# Patient Record
Sex: MALE | Race: WHITE | NOT HISPANIC OR LATINO | Employment: FULL TIME | URBAN - METROPOLITAN AREA
[De-identification: names, ages, dates, MRNs, and addresses within clinical notes are randomized per-mention and may not be internally consistent; named-entity substitution may affect disease eponyms.]

---

## 2017-05-30 ENCOUNTER — HOSPITAL ENCOUNTER (EMERGENCY)
Facility: CLINIC | Age: 57
Discharge: HOME | End: 2017-05-30
Attending: FAMILY MEDICINE | Admitting: FAMILY MEDICINE
Payer: COMMERCIAL

## 2017-05-30 VITALS
SYSTOLIC BLOOD PRESSURE: 157 MMHG | WEIGHT: 170 LBS | OXYGEN SATURATION: 98 % | TEMPERATURE: 97.8 F | RESPIRATION RATE: 16 BRPM | DIASTOLIC BLOOD PRESSURE: 73 MMHG

## 2017-05-30 DIAGNOSIS — S61.215A LACERATION OF LEFT RING FINGER W/O FOREIGN BODY W/O DAMAGE TO NAIL, INITIAL ENCOUNTER: ICD-10-CM

## 2017-05-30 DIAGNOSIS — S61.217A LACERATION OF LEFT LITTLE FINGER W/O FOREIGN BODY W/O DAMAGE TO NAIL, INITIAL ENCOUNTER: ICD-10-CM

## 2017-05-30 PROCEDURE — 99283 EMERGENCY DEPT VISIT LOW MDM: CPT | Mod: 25

## 2017-05-30 PROCEDURE — 99283 EMERGENCY DEPT VISIT LOW MDM: CPT | Mod: 25 | Performed by: FAMILY MEDICINE

## 2017-05-30 PROCEDURE — 12002 RPR S/N/AX/GEN/TRNK2.6-7.5CM: CPT

## 2017-05-30 PROCEDURE — 12002 RPR S/N/AX/GEN/TRNK2.6-7.5CM: CPT | Performed by: FAMILY MEDICINE

## 2017-05-30 RX ORDER — ATENOLOL 25 MG/1
12.5 TABLET ORAL DAILY
COMMUNITY
Start: 2016-11-08

## 2017-05-30 RX ORDER — CEPHALEXIN 500 MG/1
500 CAPSULE ORAL 4 TIMES DAILY
Qty: 12 CAPSULE | Refills: 0 | Status: SHIPPED | OUTPATIENT
Start: 2017-05-30 | End: 2017-06-02

## 2017-05-30 RX ORDER — SIMVASTATIN 20 MG
20 TABLET ORAL EVERY EVENING
COMMUNITY
Start: 2016-11-08

## 2017-05-30 NOTE — ED AVS SNAPSHOT
[facility] Emergency Department    [address] [address]    Phone:  [phone]    Fax:  [phone]                                       Jarret Eddy   MRN: [id number]    Department:  [facility] Emergency Department   Date of Visit:  5/30/2017           Patient Information     Date Of Birth          1960        Your diagnoses for this visit were:     Laceration of left little finger w/o foreign body w/o damage to nail, initial encounter     Laceration of left ring finger w/o foreign body w/o damage to nail, initial encounter        You were seen by Ignacio Dean MD.        Discharge Instructions       Return to the Emergency Room if the following occurs:     Worsened pain, expanding redness and swelling, fever, or for any concern at anytime.    Or, follow-up with the following provider as we discussed:     Return to your primary doctor for suture removal in 10-14 days.    Medications discussed:    Antibiotic ointment twice daily until stitches are removed.  Keflex.    If you received pain-relieving or sedating medication during your time in the ER, avoid alcohol, driving automobiles, or working with machinery.  Also, a responsible adult must stay with you.      If you had X-rays or labs done we will attempt to contact you if there is a change needed in your care.      Call the Nurse Advice Line at [phone] or [phone] for any concern at anytime.      24 Hour Appointment Hotline       To make an appointment at any [facility], call [phone] ([phone]). If you don't have a family doctor or clinic, we will help you find one. [facility] are conveniently located to serve the needs of you and your family.             Review of your medicines      START taking        Dose / Directions Last dose taken    cephALEXin 500 MG capsule   Commonly known as:  KEFLEX   Dose:  500 mg   Quantity:  12 capsule        Take 1 capsule (500 mg) by mouth 4 times  daily for 3 days   Refills:  0          Our records show that you are taking the medicines listed below. If these are incorrect, please call your family doctor or clinic.        Dose / Directions Last dose taken    atenolol 25 MG tablet   Commonly known as:  TENORMIN   Dose:  12.5 mg        Take 12.5 mg by mouth daily   Refills:  0        simvastatin 20 MG tablet   Commonly known as:  ZOCOR   Dose:  20 mg        Take 20 mg by mouth every evening   Refills:  0                Prescriptions were sent or printed at these locations (1 Prescription)                   [place] [id number]  [place] [address] [address] [facility]   [address] [facility] [address]    Telephone:  [phone]   Fax:  [phone]   Hours:                  E-Prescribed (1 of 1)         cephALEXin (KEFLEX) 500 MG capsule                Orders Needing Specimen Collection     None      Pending Results     No orders found from 5/28/2017 to 5/31/2017.            Pending Culture Results     No orders found from 5/28/2017 to 5/31/2017.            Pending Results Instructions     If you had any lab results that were not finalized at the time of your Discharge, you can call the ED Lab Result RN at [phone]. You will be contacted by this team for any positive Lab results or changes in treatment. The nurses are available 7 days a week from 10A to 6:30P.  You can leave a message 24 hours per day and they will return your call.        Test Results From Your Hospital Stay               Thank you for choosing [facility]       Thank you for choosing [facility] for your care. Our goal is always to provide you with excellent care. Hearing back from our patients is one way we can continue to improve our services. Please take a few minutes to complete the written survey that you may receive in the mail after you visit with us. Thank you!        [employer] Information     [employer] lets you send messages to your doctor, view your test results, renew your  "prescriptions, schedule appointments and more. To sign up, go to [email or web address] . Click on \"Log in\" on the left side of the screen, which will take you to the Welcome page. Then click on \"Sign up Now\" on the right side of the page.     You will be asked to enter the access code listed below, as well as some personal information. Please follow the directions to create your username and password.     Your access code is: [email or web address]  Expires: 2017  6:06 PM     Your access code will  in 90 days. If you need help or a new code, please call your [facility] clinic or [phone].        Care EveryWhere ID     This is your Care EveryWhere ID. This could be used by other organizations to access your [facility] medical records  [id number]        After Visit Summary       This is your record. Keep this with you and show to your community pharmacist(s) and doctor(s) at your next visit.                  "

## 2017-05-30 NOTE — DISCHARGE INSTRUCTIONS
Return to the Emergency Room if the following occurs:     Worsened pain, expanding redness and swelling, fever, or for any concern at anytime.    Or, follow-up with the following provider as we discussed:     Return to your primary doctor for suture removal in 10-14 days.    Medications discussed:    Antibiotic ointment twice daily until stitches are removed.  Keflex.    If you received pain-relieving or sedating medication during your time in the ER, avoid alcohol, driving automobiles, or working with machinery.  Also, a responsible adult must stay with you.      If you had X-rays or labs done we will attempt to contact you if there is a change needed in your care.      Call the Nurse Advice Line at [phone] or [phone] for any concern at anytime.
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
## 2017-05-30 NOTE — ED AVS SNAPSHOT
[facility] Emergency Department    [address] [address]    Phone:  [phone]    Fax:  [phone]                                       Jarret Eddy   MRN: [id number]    Department:  [facility] Emergency Department   Date of Visit:  5/30/2017           After Visit Summary Signature Page     I have received my discharge instructions, and my questions have been answered. I have discussed any challenges I see with this plan with the nurse or doctor.    ..........................................................................................................................................  Patient/Patient Representative Signature      ..........................................................................................................................................  Patient Representative Print Name and Relationship to Patient    ..................................................               ................................................  Date                                            Time    ..........................................................................................................................................  Reviewed by Signature/Title    ...................................................              ..............................................  Date                                                            Time

## 2017-07-16 ENCOUNTER — HOSPITAL ENCOUNTER (EMERGENCY)
Facility: CLINIC | Age: 57
Discharge: HOME | End: 2017-07-16
Attending: FAMILY MEDICINE | Admitting: FAMILY MEDICINE
Payer: COMMERCIAL

## 2017-07-16 ENCOUNTER — APPOINTMENT (OUTPATIENT)
Dept: GENERAL RADIOLOGY | Facility: CLINIC | Age: 57
End: 2017-07-16
Attending: FAMILY MEDICINE
Payer: COMMERCIAL

## 2017-07-16 VITALS
RESPIRATION RATE: 16 BRPM | HEIGHT: 67 IN | DIASTOLIC BLOOD PRESSURE: 78 MMHG | HEART RATE: 86 BPM | BODY MASS INDEX: 26.68 KG/M2 | OXYGEN SATURATION: 100 % | SYSTOLIC BLOOD PRESSURE: 118 MMHG | TEMPERATURE: 98 F | WEIGHT: 170 LBS

## 2017-07-16 DIAGNOSIS — S42.102A CLOSED FRACTURE OF LEFT SCAPULA, UNSPECIFIED PART OF SCAPULA, INITIAL ENCOUNTER: ICD-10-CM

## 2017-07-16 PROCEDURE — 99283 EMERGENCY DEPT VISIT LOW MDM: CPT | Performed by: FAMILY MEDICINE

## 2017-07-16 PROCEDURE — 99283 EMERGENCY DEPT VISIT LOW MDM: CPT

## 2017-07-16 PROCEDURE — 73030 X-RAY EXAM OF SHOULDER: CPT | Mod: LT
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
## 2017-07-16 NOTE — ED AVS SNAPSHOT
[facility] Emergency Department    [address] [address]    Phone:  [phone]    Fax:  [phone]                                       Jarret Eddy   MRN: [id number]    Department:  [facility] Emergency Department   Date of Visit:  7/16/2017           After Visit Summary Signature Page     I have received my discharge instructions, and my questions have been answered. I have discussed any challenges I see with this plan with the nurse or doctor.    ..........................................................................................................................................  Patient/Patient Representative Signature      ..........................................................................................................................................  Patient Representative Print Name and Relationship to Patient    ..................................................               ................................................  Date                                            Time    ..........................................................................................................................................  Reviewed by Signature/Title    ...................................................              ..............................................  Date                                                            Time
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
## 2017-07-16 NOTE — ED AVS SNAPSHOT
[facility] Emergency Department    [address] [address]    Phone:  [phone]    Fax:  [phone]                                       Jarret Eddy   MRN: [id number]    Department:  [facility] Emergency Department   Date of Visit:  7/16/2017           Patient Information     Date Of Birth          1960        Your diagnoses for this visit were:     Closed fracture of left scapula, unspecified part of scapula, initial encounter        You were seen by Fabrice Bonilla MD.      Follow-up Information     Follow up with Aries Castillo DO. Schedule an appointment as soon as possible for a visit in 3 days.    Specialty:  Family Medicine - Sports Medicine    Contact information:    [facility]  [address] [address]  [phone]          Discharge Instructions       Shoulder immobilizer, rest, ice.  Tylenol/ibuprofen as needed for pain.  MRI of the left shoulder at your convenience and follow up in clinic with sports medicine/nonoperative orthopedics    Discharge References/Attachments     FRACTURE, SHOULDER BLADE OR COLLARBONE (ENGLISH)      24 Hour Appointment Hotline       To make an appointment at any [facility], call [phone] ([phone]). If you don't have a family doctor or clinic, we will help you find one. [facility] are conveniently located to serve the needs of you and your family.          ED Discharge Orders     MRI Upper extremity joint with & w/o contrast lt       Administration of IV contrast (contrast agent, dose, and amount) will be tailored to this examination per the appropriate written protocol listed in the Protocol E-Book, or by the supervising imaging provider.                     Review of your medicines      Our records show that you are taking the medicines listed below. If these are incorrect, please call your family doctor or clinic.        Dose / Directions Last dose taken    atenolol  25 MG tablet   Commonly known as:  TENORMIN   Dose:  12.5 mg        Take 12.5 mg by mouth daily   Refills:  0        simvastatin 20 MG tablet   Commonly known as:  ZOCOR   Dose:  20 mg        Take 20 mg by mouth every evening   Refills:  0                Procedures and tests performed during your visit     Shoulder XR, 2 view left      Orders Needing Specimen Collection     None      Pending Results     Date and Time Order Name Status Description    7/16/2017 1743 Shoulder XR, 2 view left Preliminary             Pending Culture Results     No orders found from 7/14/2017 to 7/17/2017.            Pending Results Instructions     If you had any lab results that were not finalized at the time of your Discharge, you can call the ED Lab Result RN at [phone]. You will be contacted by this team for any positive Lab results or changes in treatment. The nurses are available 7 days a week from 10A to 6:30P.  You can leave a message 24 hours per day and they will return your call.        Test Results From Your Hospital Stay        7/16/2017  6:18 PM      Narrative     SHOULDER TWO VIEW LEFT   7/16/2017 6:00 PM     HISTORY: Trauma, pain.    COMPARISON: None.     FINDINGS:  Severe degenerative changes of the left glenohumeral joint  are noted with large inferior osteophytes at the humeral head.  Moderate degenerative changes are seen at the left AC joint. The  humeral head is well located within the glenoid fossa.  Coracoclavicular space is maintained. There is irregularity of the  superior scapula on the AP view concerning for fracture. Irregular  superior glenoid is also noted and could represent fracture.        Impression     IMPRESSION:  1. Mildly displaced fracture of the superior scapular wing could  extend into the upper glenoid fossa.  2. Severe degenerative changes of the glenohumeral joint and moderate  degenerative changes of the AC joint.                Thank you for choosing [facility]       Thank you for  "choosing [facility] for your care. Our goal is always to provide you with excellent care. Hearing back from our patients is one way we can continue to improve our services. Please take a few minutes to complete the written survey that you may receive in the mail after you visit with us. Thank you!        [email or web address] Information     [employer] lets you send messages to your doctor, view your test results, renew your prescriptions, schedule appointments and more. To sign up, go to [email or web address] . Click on \"Log in\" on the left side of the screen, which will take you to the Welcome page. Then click on \"Sign up Now\" on the right side of the page.     You will be asked to enter the access code listed below, as well as some personal information. Please follow the directions to create your username and password.     Your access code is: [email or web address]  Expires: 2017  6:06 PM     Your access code will  in 90 days. If you need help or a new code, please call your [facility] clinic or [phone].        Care EveryWhere ID     This is your Care EveryWhere ID. This could be used by other organizations to access your [facility] medical records  [id number]        Equal Access to Services     TAM IBARRA : Charity Del Cid, carmita salazar, gómez chandler, jerry davidson. So [facility] [phone].    ATENCIÓN: Si habla español, tiene a zepeda disposición servicios gratuitos de asistencia lingüística. Llame al [phone].    We comply with applicable federal civil rights laws and [place] laws. We do not discriminate on the basis of race, color, national origin, age, disability sex, sexual orientation or gender identity.            After Visit Summary       This is your record. Keep this with you and show to your community pharmacist(s) and doctor(s) at your next visit.                  "

## 2017-07-16 NOTE — DISCHARGE INSTRUCTIONS
Shoulder immobilizer, rest, ice.  Tylenol/ibuprofen as needed for pain.  MRI of the left shoulder at your convenience and follow up in clinic with sports medicine/nonoperative orthopedics
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
## 2017-07-17 ENCOUNTER — OFFICE VISIT (OUTPATIENT)
Dept: ORTHOPEDICS | Facility: CLINIC | Age: 57
End: 2017-07-17
Payer: COMMERCIAL

## 2017-07-17 ENCOUNTER — HOSPITAL ENCOUNTER (OUTPATIENT)
Dept: CT IMAGING | Facility: CLINIC | Age: 57
Discharge: HOME | End: 2017-07-17
Attending: FAMILY MEDICINE | Admitting: FAMILY MEDICINE
Payer: COMMERCIAL

## 2017-07-17 VITALS
SYSTOLIC BLOOD PRESSURE: 170 MMHG | HEIGHT: 67 IN | HEART RATE: 60 BPM | BODY MASS INDEX: 26.68 KG/M2 | DIASTOLIC BLOOD PRESSURE: 79 MMHG | WEIGHT: 170 LBS

## 2017-07-17 DIAGNOSIS — M19.012 OSTEOARTHRITIS OF GLENOHUMERAL JOINT, LEFT: ICD-10-CM

## 2017-07-17 DIAGNOSIS — S42.102A CLOSED FRACTURE OF LEFT SCAPULA, UNSPECIFIED PART OF SCAPULA, INITIAL ENCOUNTER: ICD-10-CM

## 2017-07-17 DIAGNOSIS — S49.92XD SHOULDER INJURY, LEFT, SUBSEQUENT ENCOUNTER: ICD-10-CM

## 2017-07-17 DIAGNOSIS — S42.112D CLOSED DISPLACED FRACTURE OF BODY OF LEFT SCAPULA WITH ROUTINE HEALING, SUBSEQUENT ENCOUNTER: Primary | ICD-10-CM

## 2017-07-17 PROCEDURE — 73200 CT UPPER EXTREMITY W/O DYE: CPT | Mod: LT

## 2017-07-17 PROCEDURE — 99204 OFFICE O/P NEW MOD 45 MIN: CPT | Performed by: FAMILY MEDICINE
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
## 2017-07-17 NOTE — ED NOTES
Pt fit with immobilizer, CMS intact after placement. DC instructions reviewed with pt states understanding.

## 2017-08-02 ENCOUNTER — OFFICE VISIT (OUTPATIENT)
Dept: ORTHOPEDICS | Facility: CLINIC | Age: 57
End: 2017-08-02
Payer: COMMERCIAL

## 2017-08-02 VITALS
DIASTOLIC BLOOD PRESSURE: 85 MMHG | WEIGHT: 170 LBS | SYSTOLIC BLOOD PRESSURE: 130 MMHG | BODY MASS INDEX: 26.68 KG/M2 | HEIGHT: 67 IN

## 2017-08-02 DIAGNOSIS — S49.92XD SHOULDER INJURY, LEFT, SUBSEQUENT ENCOUNTER: Primary | ICD-10-CM

## 2017-08-02 DIAGNOSIS — M19.012 OSTEOARTHRITIS OF GLENOHUMERAL JOINT, LEFT: ICD-10-CM

## 2017-08-02 PROCEDURE — 99213 OFFICE O/P EST LOW 20 MIN: CPT | Performed by: FAMILY MEDICINE
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
## 2017-11-13 ENCOUNTER — OFFICE VISIT (OUTPATIENT)
Dept: ORTHOPEDICS | Facility: CLINIC | Age: 57
End: 2017-11-13
Payer: COMMERCIAL

## 2017-11-13 ENCOUNTER — RADIANT APPOINTMENT (OUTPATIENT)
Dept: GENERAL RADIOLOGY | Facility: CLINIC | Age: 57
End: 2017-11-13
Attending: PEDIATRICS
Payer: COMMERCIAL

## 2017-11-13 VITALS
SYSTOLIC BLOOD PRESSURE: 128 MMHG | WEIGHT: 177 LBS | HEIGHT: 67 IN | DIASTOLIC BLOOD PRESSURE: 76 MMHG | BODY MASS INDEX: 27.78 KG/M2

## 2017-11-13 DIAGNOSIS — M25.562 PAIN IN BOTH KNEES, UNSPECIFIED CHRONICITY: ICD-10-CM

## 2017-11-13 DIAGNOSIS — M25.561 PAIN IN BOTH KNEES, UNSPECIFIED CHRONICITY: Primary | ICD-10-CM

## 2017-11-13 DIAGNOSIS — S89.91XA INJURY OF RIGHT KNEE, INITIAL ENCOUNTER: ICD-10-CM

## 2017-11-13 DIAGNOSIS — M25.562 PAIN IN BOTH KNEES, UNSPECIFIED CHRONICITY: Primary | ICD-10-CM

## 2017-11-13 DIAGNOSIS — M25.561 PAIN IN BOTH KNEES, UNSPECIFIED CHRONICITY: ICD-10-CM

## 2017-11-13 PROCEDURE — 99214 OFFICE O/P EST MOD 30 MIN: CPT | Performed by: PEDIATRICS

## 2017-11-13 PROCEDURE — 73562 X-RAY EXAM OF KNEE 3: CPT | Mod: LT | Performed by: PEDIATRICS

## 2017-11-13 NOTE — MR AVS SNAPSHOT
After Visit Summary   11/13/2017    Jarret Eddy    MRN: [id number]           Patient Information     Date Of Birth          1960        Visit Information        Provider Department      11/13/2017 2:20 PM Aries Barone,  [facility]        Today's Diagnoses     Pain in both knees, unspecified chronicity    -  1    Injury of right knee, initial encounter          Care Instructions     Advanced imaging is done by appointment. Some insurance require a prior authorization to be completed which may delay the time until you are able to schedule your appointment.    Please call [facility]: [phone] to schedule your MRI.  Depending on your availability you can usually schedule within the next 1-2 days.    The clinic will call you with results, if you have not heard from the clinic within 3-4 days following your MRI please contact us at the number listed below.     If you have any further questions for your physician or physician s care team you can call [phone] and use option 3 to leave a voice message. Calls received during business hours will be returned same day.                  Follow-ups after your visit        Future tests that were ordered for you today     Open Future Orders        Priority Expected Expires Ordered    MR Knee Right w/o Contrast Routine  11/13/2018 11/13/2017            Who to contact     If you have questions or need follow up information about today's clinic visit or your schedule please contact [facility] directly at [phone].  Normal or non-critical lab and imaging results will be communicated to you by MyChart, letter or phone within 4 business days after the clinic has received the results. If you do not hear from us within 7 days, please contact the clinic through MyChart or phone. If you have a critical or abnormal lab result, we will notify you by phone as  "soon as possible.  Submit refill requests through [facility] or call your pharmacy and they will forward the refill request to us. Please allow 3 business days for your refill to be completed.          Additional Information About Your Visit        [employer] Information     [facility] lets you send messages to your doctor, view your test results, renew your prescriptions, schedule appointments and more. To sign up, go to [email or web address] . Click on \"Log in\" on the left side of the screen, which will take you to the Welcome page. Then click on \"Sign up Now\" on the right side of the page.     You will be asked to enter the access code listed below, as well as some personal information. Please follow the directions to create your username and password.     Your access code is: [email or web address]  Expires: 2018  3:20 PM     Your access code will  in 90 days. If you need help or a new code, please call your [facility] clinic or [phone].        Care EveryWhere ID     This is your Care EveryWhere ID. This could be used by other organizations to access your [facility] medical records  [id number]        Your Vitals Were     Height BMI (Body Mass Index)                5' 7\" (1.702 m) 27.72 kg/m2           Blood Pressure from Last 3 Encounters:   17 128/76   17 130/85   17 170/79    Weight from Last 3 Encounters:   17 177 lb (80.3 kg)   17 170 lb (77.1 kg)   17 170 lb (77.1 kg)               Primary Care Provider Office Phone # Fax #    [place] [facility] [phone] [address] [address]        Equal Access to Services     TAM IBARRA : Charity Del Cid, carmita salazar, jerry reynoso. So [facility] [phone].    ATENCIÓN: Si habla español, tiene a zepeda disposición servicios gratuitos de asistencia lingüística. Yunior al [phone].    We comply with applicable " federal civil rights laws and [place] laws. We do not discriminate on the basis of race, color, national origin, age, disability, sex, sexual orientation, or gender identity.            Thank you!     Thank you for choosing [facility]  for your care. Our goal is always to provide you with excellent care. Hearing back from our patients is one way we can continue to improve our services. Please take a few minutes to complete the written survey that you may receive in the mail after your visit with us. Thank you!             Your Updated Medication List - Protect others around you: Learn how to safely use, store and throw away your medicines at www.disposemymeds.org.          This list is accurate as of: 11/13/17  3:20 PM.  Always use your most recent med list.                   Brand Name Dispense Instructions for use Diagnosis    atenolol 25 MG tablet    TENORMIN     Take 12.5 mg by mouth daily        simvastatin 20 MG tablet    ZOCOR     Take 20 mg by mouth every evening
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
## 2017-11-13 NOTE — PATIENT INSTRUCTIONS
Advanced imaging is done by appointment. Some insurance require a prior authorization to be completed which may delay the time until you are able to schedule your appointment.    Please call [facility]: [phone] to schedule your MRI.  Depending on your availability you can usually schedule within the next 1-2 days.    The clinic will call you with results, if you have not heard from the clinic within 3-4 days following your MRI please contact us at the number listed below.     If you have any further questions for your physician or physician s care team you can call [phone] and use option 3 to leave a voice message. Calls received during business hours will be returned same day.
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
## 2017-11-15 ENCOUNTER — RADIANT APPOINTMENT (OUTPATIENT)
Dept: MRI IMAGING | Facility: CLINIC | Age: 57
End: 2017-11-15
Attending: PEDIATRICS
Payer: COMMERCIAL

## 2017-11-15 DIAGNOSIS — S89.91XA INJURY OF RIGHT KNEE, INITIAL ENCOUNTER: ICD-10-CM

## 2017-11-15 PROCEDURE — 73721 MRI JNT OF LWR EXTRE W/O DYE: CPT | Mod: TC

## 2017-11-17 ENCOUNTER — TELEPHONE (OUTPATIENT)
Dept: ORTHOPEDICS | Facility: CLINIC | Age: 57
End: 2017-11-17

## 2017-11-17 DIAGNOSIS — S83.241D OTHER TEAR OF MEDIAL MENISCUS OF RIGHT KNEE AS CURRENT INJURY, SUBSEQUENT ENCOUNTER: Primary | ICD-10-CM

## 2017-11-17 DIAGNOSIS — S83.511D RUPTURE OF ANTERIOR CRUCIATE LIGAMENT OF RIGHT KNEE, SUBSEQUENT ENCOUNTER: ICD-10-CM

## 2017-11-17 NOTE — TELEPHONE ENCOUNTER
Results for orders placed or performed in visit on 11/15/17   MR Knee Right w/o Contrast    Narrative    MR KNEE RIGHT WITHOUT CONTRAST   11/15/2017 3:51 PM    HISTORY:  Right knee pain and swelling since 8/30/2017 following an  injury.    COMPARISON: Radiographs on 11/13/2017.    TECHNIQUE:  Transverse and coronal T2 with fat suppression. Coronal  T1. Sagittal proton density and T2.    FINDINGS:     Medial Meniscus: There is a moderate-sized tear of the posterior horn  involving the superior and inferior articular surfaces. No displaced  meniscal fragment is seen.     Lateral Meniscus: There is prominent increased signal seen throughout  the majority of the meniscus, including horizontal linear component  involving the free edge articular surface. No displaced meniscal  fragment is seen.     Anterior Cruciate Ligament: The proximal fibers are not clearly seen  and the mid to distal fibers are oriented more horizontally than  typically seen. This is suspicious for a high-grade likely complete  tear.     Posterior Cruciate Ligament: Unremarkable.     Medial Collateral Ligament: Unremarkable.    Lateral Collateral Ligament Complex, Popliteus Tendon: The iliotibial  band, fibular collateral ligament, biceps femoris tendon, and  popliteus tendon are unremarkable.    Osseous and Cartilaginous Structures: No fracture or osseous lesion is  demonstrated. There is moderate marrow edema within the posterior  aspect of the lateral tibial plateau. No other abnormal marrow signal  intensity is seen. There is diffuse grade 2 chondromalacia throughout  the entire lateral compartment and to a lesser degree, the medial  femoral condyle and medial patellar facet.     Extensor Mechanism: The quadriceps and patellar tendons are  unremarkable. The medial and lateral patellar retinacula appear  unremarkable.    Joint Space: Large joint effusion. No definite loose bodies  appreciated.    Additional Findings: No Baker's cyst. No  semimembranosus-tibial  collateral ligament or pes anserine bursitis. No adjacent soft tissue  pathology is seen.      Impression    IMPRESSION:   1. Prominent tear of the lateral meniscus with a moderate-sized tear  of the medial meniscus.  2. High-grade probable complete tear of the proximal anterior cruciate  ligament.  3. Diffuse degenerative changes throughout the knee. Marrow edema in  the posterior aspect of the lateral tibial plateau may be degenerative  in nature or could represent a bone contusion.    LALI DE LA GARZA MD

## 2017-11-22 NOTE — TELEPHONE ENCOUNTER
LVM  For patient to call back.  Asked patient to state if he would like a detmailed message left.  Ann Arboleda MS ATC

## 2017-11-22 NOTE — TELEPHONE ENCOUNTER
"Medial meniscus tear, also some lateral tearing. Findings also consistent with ACL tear. Also with degenerative change (chondromalacia), diffuse.  Options: 1) trial rehab (PT), monitoring with time, recheck in a few weeks; 2) trial steroid injection for pain relief, but will not \"fix\" the issues; 3) referral to ortho surgeon for further eval, may be candidate for surgery related to meniscus, though not likely for ACL. May return to clinic to discuss issues and review MRI as well.  Thanks.  Aries Barone, DO, CAQ    "
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
## 2017-11-24 NOTE — TELEPHONE ENCOUNTER
Patient called today at 9:33. He said he was calling for his MRI results. We can call him back at [phone]    Rachel FRANK (R)

## 2017-11-27 ENCOUNTER — OFFICE VISIT (OUTPATIENT)
Dept: ORTHOPEDICS | Facility: CLINIC | Age: 57
End: 2017-11-27
Payer: COMMERCIAL

## 2017-11-27 VITALS — WEIGHT: 178 LBS | HEIGHT: 67 IN | RESPIRATION RATE: 14 BRPM | BODY MASS INDEX: 27.94 KG/M2

## 2017-11-27 DIAGNOSIS — S83.271A COMPLEX TEAR OF LATERAL MENISCUS OF RIGHT KNEE, UNSPECIFIED WHETHER OLD OR CURRENT TEAR, INITIAL ENCOUNTER: ICD-10-CM

## 2017-11-27 DIAGNOSIS — M23.303 DERANGEMENT OF MEDIAL MENISCUS, RIGHT: ICD-10-CM

## 2017-11-27 DIAGNOSIS — M25.561 ACUTE PAIN OF RIGHT KNEE: Primary | ICD-10-CM

## 2017-11-27 DIAGNOSIS — S83.511A RUPTURE OF ANTERIOR CRUCIATE LIGAMENT OF RIGHT KNEE, INITIAL ENCOUNTER: ICD-10-CM

## 2017-11-27 DIAGNOSIS — M17.11 PRIMARY OSTEOARTHRITIS OF RIGHT KNEE: ICD-10-CM

## 2017-11-27 PROCEDURE — 20610 DRAIN/INJ JOINT/BURSA W/O US: CPT | Mod: RT | Performed by: ORTHOPAEDIC SURGERY

## 2017-11-27 PROCEDURE — 99204 OFFICE O/P NEW MOD 45 MIN: CPT | Mod: 25 | Performed by: ORTHOPAEDIC SURGERY

## 2017-11-27 RX ORDER — METHYLPREDNISOLONE ACETATE 80 MG/ML
80 INJECTION, SUSPENSION INTRA-ARTICULAR; INTRALESIONAL; INTRAMUSCULAR; SOFT TISSUE ONCE
Qty: 5 ML | Refills: 0 | OUTPATIENT
Start: 2017-11-27 | End: 2017-11-27

## 2017-11-27 ASSESSMENT — PAIN SCALES - GENERAL: PAINLEVEL: NO PAIN (1)
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
## 2017-11-27 NOTE — PROGRESS NOTES
The patient's right knee was prepped with betadine solution after verification of allergies. Area approximately 10 cm x 10 cm prepped in a sterile fashion. After injection, betadine removed with soap and water and band-aids applied.    4cc Lidocaine 1%  NDC 3724-2977-93, LOT -DK,  2019  3cc Bupivacaine 0.25% NDC 9147-0489-25, LOT -DK,  2018  1cc Depo Medrol NDC 0575-5834-26, LOT [id number],  2020  injected into patient's right knee by Dr.Troy Ashton
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
## 2018-09-22 LAB — HEMOCCULT STL QL IA: NEGATIVE

## 2019-01-09 ENCOUNTER — OFFICE VISIT (OUTPATIENT)
Dept: FAMILY MEDICINE | Facility: CLINIC | Age: 59
End: 2019-01-09
Payer: COMMERCIAL

## 2019-01-09 ENCOUNTER — ANCILLARY PROCEDURE (OUTPATIENT)
Dept: GENERAL RADIOLOGY | Facility: CLINIC | Age: 59
End: 2019-01-09
Payer: COMMERCIAL

## 2019-01-09 VITALS
SYSTOLIC BLOOD PRESSURE: 134 MMHG | OXYGEN SATURATION: 99 % | BODY MASS INDEX: 28.19 KG/M2 | DIASTOLIC BLOOD PRESSURE: 71 MMHG | TEMPERATURE: 98.1 F | RESPIRATION RATE: 16 BRPM | HEART RATE: 54 BPM | WEIGHT: 180 LBS

## 2019-01-09 DIAGNOSIS — M25.512 CHRONIC LEFT SHOULDER PAIN: Primary | ICD-10-CM

## 2019-01-09 DIAGNOSIS — M25.512 CHRONIC LEFT SHOULDER PAIN: ICD-10-CM

## 2019-01-09 DIAGNOSIS — Z12.11 SPECIAL SCREENING FOR MALIGNANT NEOPLASMS, COLON: ICD-10-CM

## 2019-01-09 DIAGNOSIS — G89.29 CHRONIC LEFT SHOULDER PAIN: ICD-10-CM

## 2019-01-09 DIAGNOSIS — G89.29 CHRONIC LEFT SHOULDER PAIN: Primary | ICD-10-CM

## 2019-01-09 PROCEDURE — 72040 X-RAY EXAM NECK SPINE 2-3 VW: CPT

## 2019-01-09 PROCEDURE — 99214 OFFICE O/P EST MOD 30 MIN: CPT | Performed by: PHYSICIAN ASSISTANT

## 2019-01-09 RX ORDER — GABAPENTIN 300 MG/1
300 CAPSULE ORAL 3 TIMES DAILY
Qty: 270 CAPSULE | Refills: 3 | Status: SHIPPED | OUTPATIENT
Start: 2019-01-09 | End: 2019-09-23

## 2019-01-09 RX ORDER — PREDNISONE 10 MG/1
TABLET ORAL
Qty: 42 TABLET | Refills: 0 | Status: SHIPPED | OUTPATIENT
Start: 2019-01-09 | End: 2019-03-19
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
## 2019-03-19 ENCOUNTER — OFFICE VISIT (OUTPATIENT)
Dept: FAMILY MEDICINE | Facility: CLINIC | Age: 59
End: 2019-03-19
Payer: COMMERCIAL

## 2019-03-19 VITALS
DIASTOLIC BLOOD PRESSURE: 80 MMHG | TEMPERATURE: 97.7 F | RESPIRATION RATE: 14 BRPM | HEIGHT: 67 IN | OXYGEN SATURATION: 97 % | WEIGHT: 180.25 LBS | HEART RATE: 58 BPM | SYSTOLIC BLOOD PRESSURE: 132 MMHG | BODY MASS INDEX: 28.29 KG/M2

## 2019-03-19 DIAGNOSIS — R20.2 PARESTHESIA OF LEFT UPPER EXTREMITY: Primary | ICD-10-CM

## 2019-03-19 DIAGNOSIS — Z12.11 SPECIAL SCREENING FOR MALIGNANT NEOPLASMS, COLON: ICD-10-CM

## 2019-03-19 PROCEDURE — 99214 OFFICE O/P EST MOD 30 MIN: CPT | Performed by: PHYSICIAN ASSISTANT

## 2019-03-19 ASSESSMENT — MIFFLIN-ST. JEOR: SCORE: 1591.24

## 2019-03-19 NOTE — PROGRESS NOTES
SUBJECTIVE:   Jarret Eddy is a 58 year old male who presents to clinic today for the following health issues:      Joint Pain    Onset: 6 months     Description:   Location: L arm   Character: Cramping    Intensity: moderate    Progression of Symptoms: worse/ intermittent     Accompanying Signs & Symptoms:  Other symptoms: numbness, tingling and weakness of L arm/hand    History:   Previous similar pain: no       Precipitating factors:   Trauma or overuse: no     Alleviating factors:  Improved by: nothing    Therapies Tried and outcome: none            Problem list and histories reviewed & adjusted, as indicated.  Additional history: as documented    Patient Active Problem List   Diagnosis     CARDIOVASCULAR SCREENING; LDL GOAL LESS THAN 160     No past surgical history on file.    Social History     Tobacco Use     Smoking status: Former Smoker     Types: Cigarettes     Smokeless tobacco: Never Used   Substance Use Topics     Alcohol use: No     No family history on file.      Current Outpatient Medications   Medication Sig Dispense Refill     atenolol (TENORMIN) 25 MG tablet Take 12.5 mg by mouth daily       gabapentin (NEURONTIN) 300 MG capsule Take 1 capsule (300 mg) by mouth 3 times daily 270 capsule 3     simvastatin (ZOCOR) 20 MG tablet Take 20 mg by mouth every evening       No Known Allergies  No lab results found.   BP Readings from Last 3 Encounters:   03/19/19 132/80   01/09/19 134/71   11/13/17 128/76    Wt Readings from Last 3 Encounters:   03/19/19 81.8 kg (180 lb 4 oz)   01/09/19 81.6 kg (180 lb)   11/27/17 80.7 kg (178 lb)                  Labs reviewed in EPIC    Reviewed and updated as needed this visit by clinical staff  Tobacco  Allergies  Meds       Reviewed and updated as needed this visit by Provider         All other systems negative except as outline above  OBJECTIVE:    Neck rom slightly limited.  UE strength rom of and dtr's normal and symmetric.  Negative spurlings test.  No tos  tests  Exam for Carpal Tunnel syndrome shows both sides negative - Tinel and Phalen tests are negative, normal sensation, no atrophy.  No atrophy   Normal pulses.    Jarret was seen today for musculoskeletal problem.    Diagnoses and all orders for this visit:    Paresthesia of left upper extremity  -     NEUROLOGY ADULT REFERRAL  -     EMG; Future    Special screening for malignant neoplasms, colon  -     Fecal colorectal cancer screen (FIT); Future      Advised supportive and symptomatic treatment.  Follow up with Provider - if condition persists or worsens.

## 2019-03-20 ENCOUNTER — DOCUMENTATION ONLY (OUTPATIENT)
Dept: CARE COORDINATION | Facility: CLINIC | Age: 59
End: 2019-03-20

## 2019-04-04 ENCOUNTER — OFFICE VISIT (OUTPATIENT)
Dept: NEUROLOGY | Facility: CLINIC | Age: 59
End: 2019-04-04

## 2019-04-04 DIAGNOSIS — G56.02 CARPAL TUNNEL SYNDROME OF LEFT WRIST: Primary | ICD-10-CM

## 2019-04-04 NOTE — PROGRESS NOTES
[facility]  Neurology Clinic    Nerve Conduction & EMG Report          Patient:       Jarret Eddy  Patient ID:    [id number]  Gender:        Male  YOB: 1960  Age:           58 Years 5 Months      Referring Provider: Domingo Gleason PA-C    History & Examination:    58 year old man with paresthesias and pain at the left hand, specifically digits 1-4. Query left carpal tunnel syndrome vs cervical radiculopathy.    Techniques: Sensory and motor conduction studies were done with surface recording electrodes. EMG was done with a concentric needle electrode.     Results:    Left median antidromic sensory NCSs showed markedly attenuated conduction velocity and SNAP amplitude. Left ulnar antidromic sensory NCS was normal. Left median motor NCS recorded from the APB showed prolonged distal latency, normal CMAP amplitudes and conduction velocity. Left median motor NCS recorded from the second lumbricals showed markedly prolonged distal latency. Left ulnar motor NCSs recorded from the ADM and the palmar interossei were normal. EMG of the left FDI, triceps, deltoid, biceps, pronator teres, and C7 paraspinal muscles was normal.     Interpretation:    Abnormal study. There is electrodiagnostic evidence of left carpal tunnel syndrome, of moderate severity. There is no electrodiagnostic evidence of left cervical radiculopathy.     EMG Physician:    Oc Sandoval MD         Sensory NCS      Nerve / Sites Rec. Site Onset Peak NP Amp Ref. PP Amp Dist Ha Ref. Temp     ms ms  V  V  V cm m/s m/s  C   L MEDIAN - Dig II Anti      Wrist Dig II 5.21 6.35 3.1 10.0 9.7 14 26.9 48.0 32.4   L ULNAR - Dig V Anti      Wrist Dig V 2.55 3.39 17.6 8.0 22.4 12.5 49.0 48.0 32.3       Motor NCS      Nerve / Sites Rec. Site Lat Ref. Amp Ref. Rel Amp Dist Ha Ref. Dur. Area Temp.     ms ms mV mV % cm m/s m/s ms %  C   L MEDIAN - APB      Wrist APB 5.57 4.40 11.5 5.0 100 8   6.93 100 32.4      Elbow APB 9.53  10.5  91.6  20.5 51.8 48.0 7.34 88.5 32.3   L ULNAR - ADM      Wrist ADM 2.76 3.50 11.1 5.0 100 8   9.43 100 32.4      B.Elbow ADM 5.94  10.8  98 19 59.8 48.0 9.48 99.7 32.4      A.Elbow ADM 7.60  10.6  95.4 10 60.0 48.0 9.58 96 32.3   L MEDIAN - II Lumb      Median II Lumb 5.47  2.0  100 10   6.72 100 32.4      Ulnar Palm Int 3.02  6.2  313 10   5.99 221 32.3       EMG Summary Table     Spontaneous MUAP Recruitment    IA Fib PSW Fasc H.F. Amp Dur. PPP Pattern   L. FIRST D INTEROSS N None None None None N N N N   L. TRICEPS N None None None None N N N N   L. BICEPS N None None None None N N N N   L. DELTOID N None None None None N N N N   L. PRON TERES N None None None None N N N N   L. C7 PSP N None None None None N N N N

## 2019-04-04 NOTE — LETTER
4/4/2019       RE: Jarret Eddy  [address]  [place] [address]     Dear Colleague,    Thank you for referring your patient, Jarret Eddy, to the [facility] EMG at [facility]. Please see a copy of my visit note below.        [facility]  Neurology Clinic    Nerve Conduction & EMG Report      Patient:       Jarret Eddy  Patient ID:    [id number]  Gender:        Male  YOB: 1960  Age:           58 Years 5 Months      Referring Provider: Domingo Gleason PA-C    History & Examination:    58 year old man with paresthesias and pain at the left hand, specifically digits 1-4. Query left carpal tunnel syndrome vs cervical radiculopathy.    Techniques: Sensory and motor conduction studies were done with surface recording electrodes. EMG was done with a concentric needle electrode.     Results:    Left median antidromic sensory NCSs showed markedly attenuated conduction velocity and SNAP amplitude. Left ulnar antidromic sensory NCS was normal. Left median motor NCS recorded from the APB showed prolonged distal latency, normal CMAP amplitudes and conduction velocity. Left median motor NCS recorded from the second lumbricals showed markedly prolonged distal latency. Left ulnar motor NCSs recorded from the ADM and the palmar interossei were normal. EMG of the left FDI, triceps, deltoid, biceps, pronator teres, and C7 paraspinal muscles was normal.     Interpretation:    Abnormal study. There is electrodiagnostic evidence of left carpal tunnel syndrome, of moderate severity. There is no electrodiagnostic evidence of left cervical radiculopathy.     EMG Physician:    Oc Sandoval MD       Sensory NCS      Nerve / Sites Rec. Site Onset Peak NP Amp Ref. PP Amp Dist Ha Ref. Temp     ms ms  V  V  V cm m/s m/s  C   L MEDIAN - Dig II Anti      Wrist Dig II 5.21 6.35 3.1 10.0 9.7 14 26.9 48.0 32.4   L ULNAR - Dig V Anti      Wrist Dig V 2.55 3.39 17.6 8.0  22.4 12.5 49.0 48.0 32.3       Motor NCS      Nerve / Sites Rec. Site Lat Ref. Amp Ref. Rel Amp Dist Ha Ref. Dur. Area Temp.     ms ms mV mV % cm m/s m/s ms %  C   L MEDIAN - APB      Wrist APB 5.57 4.40 11.5 5.0 100 8   6.93 100 32.4      Elbow APB 9.53  10.5  91.6 20.5 51.8 48.0 7.34 88.5 32.3   L ULNAR - ADM      Wrist ADM 2.76 3.50 11.1 5.0 100 8   9.43 100 32.4      B.Elbow ADM 5.94  10.8  98 19 59.8 48.0 9.48 99.7 32.4      A.Elbow ADM 7.60  10.6  95.4 10 60.0 48.0 9.58 96 32.3   L MEDIAN - II Lumb      Median II Lumb 5.47  2.0  100 10   6.72 100 32.4      Ulnar Palm Int 3.02  6.2  313 10   5.99 221 32.3       EMG Summary Table     Spontaneous MUAP Recruitment    IA Fib PSW Fasc H.F. Amp Dur. PPP Pattern   L. FIRST D INTEROSS N None None None None N N N N   L. TRICEPS N None None None None N N N N   L. BICEPS N None None None None N N N N   L. DELTOID N None None None None N N N N   L. PRON TERES N None None None None N N N N   L. C7 PSP N None None None None N N N N                  Again, thank you for allowing me to participate in the care of your patient.      Sincerely,    Oc Sandoval MD

## 2019-04-22 ENCOUNTER — MYC MEDICAL ADVICE (OUTPATIENT)
Dept: FAMILY MEDICINE | Facility: CLINIC | Age: 59
End: 2019-04-22

## 2019-04-23 NOTE — TELEPHONE ENCOUNTER
Spoke with patient-per OV note Domingo MCDONALD was waiting to hear back from patient in regards to scheduling. EMG was faxed to [facility] for an appointment with  on 04/29/19.  [phone] fax [phone], patient advised to check with his insurance for any coverage questions.

## 2019-04-29 ENCOUNTER — TRANSFERRED RECORDS (OUTPATIENT)
Dept: HEALTH INFORMATION MANAGEMENT | Facility: CLINIC | Age: 59
End: 2019-04-29

## 2019-06-07 ENCOUNTER — TRANSFERRED RECORDS (OUTPATIENT)
Dept: HEALTH INFORMATION MANAGEMENT | Facility: CLINIC | Age: 59
End: 2019-06-07

## 2019-06-27 ENCOUNTER — TRANSFERRED RECORDS (OUTPATIENT)
Dept: HEALTH INFORMATION MANAGEMENT | Facility: CLINIC | Age: 59
End: 2019-06-27

## 2019-07-31 ENCOUNTER — HOSPITAL ENCOUNTER (EMERGENCY)
Facility: CLINIC | Age: 59
Discharge: HOME | End: 2019-07-31
Attending: NURSE PRACTITIONER | Admitting: NURSE PRACTITIONER
Payer: COMMERCIAL

## 2019-07-31 ENCOUNTER — APPOINTMENT (OUTPATIENT)
Dept: GENERAL RADIOLOGY | Facility: CLINIC | Age: 59
End: 2019-07-31
Attending: NURSE PRACTITIONER
Payer: COMMERCIAL

## 2019-07-31 VITALS
TEMPERATURE: 97.7 F | WEIGHT: 170 LBS | RESPIRATION RATE: 18 BRPM | BODY MASS INDEX: 26.63 KG/M2 | SYSTOLIC BLOOD PRESSURE: 159 MMHG | OXYGEN SATURATION: 97 % | DIASTOLIC BLOOD PRESSURE: 79 MMHG

## 2019-07-31 DIAGNOSIS — S52.222A CLOSED DISPLACED TRANSVERSE FRACTURE OF SHAFT OF LEFT ULNA, INITIAL ENCOUNTER: ICD-10-CM

## 2019-07-31 PROCEDURE — 99213 OFFICE O/P EST LOW 20 MIN: CPT | Mod: 25 | Performed by: NURSE PRACTITIONER

## 2019-07-31 PROCEDURE — 25530 CLTX ULNAR SHFT FX W/O MNPJ: CPT | Mod: LT | Performed by: NURSE PRACTITIONER

## 2019-07-31 PROCEDURE — G0463 HOSPITAL OUTPT CLINIC VISIT: HCPCS | Mod: 25 | Performed by: NURSE PRACTITIONER

## 2019-07-31 PROCEDURE — 73090 X-RAY EXAM OF FOREARM: CPT | Mod: LT

## 2019-07-31 ASSESSMENT — ENCOUNTER SYMPTOMS
MYALGIAS: 0
JOINT SWELLING: 0
COUGH: 0
CHILLS: 0
DIZZINESS: 0
LIGHT-HEADEDNESS: 0
HEADACHES: 0
COLOR CHANGE: 0
WEAKNESS: 0
WOUND: 0
ARTHRALGIAS: 1
SHORTNESS OF BREATH: 0
NUMBNESS: 0
FEVER: 0

## 2019-07-31 NOTE — ED AVS SNAPSHOT
[facility]  [address] [address]  Phone:  [phone]  Fax:  [phone]                                    Jarret Eddy   MRN: [id number]    Department:  [facility] Emergency Department   Date of Visit:  7/31/2019           After Visit Summary Signature Page    I have received my discharge instructions, and my questions have been answered. I have discussed any challenges I see with this plan with the nurse or doctor.    ..........................................................................................................................................  Patient/Patient Representative Signature      ..........................................................................................................................................  Patient Representative Print Name and Relationship to Patient    ..................................................               ................................................  Date                                   Time    ..........................................................................................................................................  Reviewed by Signature/Title    ...................................................              ..............................................  Date                                               Time          22EPIC Rev 08/18

## 2019-08-02 ENCOUNTER — OFFICE VISIT (OUTPATIENT)
Dept: ORTHOPEDICS | Facility: CLINIC | Age: 59
End: 2019-08-02
Payer: COMMERCIAL

## 2019-08-02 VITALS
HEIGHT: 67 IN | SYSTOLIC BLOOD PRESSURE: 118 MMHG | DIASTOLIC BLOOD PRESSURE: 70 MMHG | BODY MASS INDEX: 26.68 KG/M2 | WEIGHT: 170 LBS

## 2019-08-02 DIAGNOSIS — S52.222A CLOSED DISPLACED TRANSVERSE FRACTURE OF SHAFT OF LEFT ULNA, INITIAL ENCOUNTER: Primary | ICD-10-CM

## 2019-08-02 PROCEDURE — 25530 CLTX ULNAR SHFT FX W/O MNPJ: CPT | Mod: 55 | Performed by: PEDIATRICS

## 2019-08-02 ASSESSMENT — MIFFLIN-ST. JEOR: SCORE: 1544.74
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
## 2019-08-02 NOTE — LETTER
[facility] [place]  [address]  [place] [address]  Phone: [phone]  Fax: [phone]      August 2, 2019      RE: Jarret Eddy  [address] [address]        To whom it may concern:    Jarret Eddy was seen in clinic today. The employee is ABLE to return to work next scheduled work date.    When the patient returns to work, the following restrictions apply until recheck (approximately 1 week):  Lift, carry, push, and pull:  None independently with left upper extremity.  May use left hand for light duty activities like use of keyboard/computer, paperwork.  Should be painfree with all activities. If not, then rest from activities.  Continue with splint, and sling is as desired.      Sincerely,            Aries ROSS.
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
## 2019-08-02 NOTE — PATIENT INSTRUCTIONS
- Ice as needed  - over-the-counter medications as needed  - Protection: Reverse splint applied  - Activity modification: No lifting, pulling, pushing and carrying  - 6-8 weeks healing  - Follow up: 1 week for additional x-ray immaging         Caring for Your Cast     A cast is used to protect an injured body part and allow it to heal by limiting the amount of motion occurring around the injury. Pain and swelling of the injured area is normal for 48 hours after your cast is put on. If you have swelling, wiggle your toes or fingers to ease it. Doing so encourages blood flow to your arm or leg.     It is important that you keep your cast dry, unless your doctor tells you differently. If the padding of the cast gets wet, your skin may be damaged and become infected. When showering or taking a bath, put the cast in a heavy plastic bag that can be held in place with a rubber band. If your cast gets wet and does not dry out in four to five hours, call your doctor s office.   To keep the cast clean, use wash clothes or baby wipes around it.   You may experience some itching inside the cast. This is normal. Avoid putting anything in the cast, even your finger, as you can injure your skin and cause infection. Try shaking some talcum powder or blowing cool air from a hair dryer into the cast to ease itching.   If these signs or symptoms develop, call your doctor immediately.       Pain gets worse     Swelling that cuts off blood flow that does not go away, even when you lift the body part above the level of your heart     Fever after itching. It may be related to an infection.     Fluid draining from your skin under the cast     Your cast may become loose as swelling goes down. If the cast feels too loose or if it is so loose you can take it off, call your doctor s office.     Your doctor or  will give you recommendations for activity based on your injury. Some sports allow casts if properly padded by a doctor  or .     For complete healing, your cast should only be removed at the direction of your doctor or clinic staff. A special saw ensures its safe removal and protects the skin and other tissue under the cast.

## 2019-08-09 ENCOUNTER — OFFICE VISIT (OUTPATIENT)
Dept: ORTHOPEDICS | Facility: CLINIC | Age: 59
End: 2019-08-09
Payer: COMMERCIAL

## 2019-08-09 ENCOUNTER — ANCILLARY PROCEDURE (OUTPATIENT)
Dept: GENERAL RADIOLOGY | Facility: CLINIC | Age: 59
End: 2019-08-09
Attending: PEDIATRICS
Payer: COMMERCIAL

## 2019-08-09 VITALS
BODY MASS INDEX: 26.68 KG/M2 | WEIGHT: 170 LBS | DIASTOLIC BLOOD PRESSURE: 68 MMHG | HEIGHT: 67 IN | SYSTOLIC BLOOD PRESSURE: 122 MMHG

## 2019-08-09 DIAGNOSIS — S52.222D CLOSED DISPLACED TRANSVERSE FRACTURE OF SHAFT OF LEFT ULNA WITH ROUTINE HEALING, SUBSEQUENT ENCOUNTER: ICD-10-CM

## 2019-08-09 DIAGNOSIS — S52.222D CLOSED DISPLACED TRANSVERSE FRACTURE OF SHAFT OF LEFT ULNA WITH ROUTINE HEALING, SUBSEQUENT ENCOUNTER: Primary | ICD-10-CM

## 2019-08-09 PROCEDURE — 73090 X-RAY EXAM OF FOREARM: CPT | Mod: LT | Performed by: PEDIATRICS

## 2019-08-09 PROCEDURE — 99207 ZZC FRACTURE CARE IN GLOBAL PERIOD: CPT | Mod: 25 | Performed by: PEDIATRICS

## 2019-08-09 PROCEDURE — 29125 APPL SHORT ARM SPLINT STATIC: CPT | Mod: 58 | Performed by: PEDIATRICS

## 2019-08-09 ASSESSMENT — MIFFLIN-ST. JEOR: SCORE: 1544.74
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
## 2019-08-09 NOTE — PROGRESS NOTES
"Sports Medicine Clinic Visit    PCP: Domingo Gleason    Jarret Eddy is a 58 year old male who is seen in f/u up for Closed displaced transverse fracture of shaft of left ulna with routine healing, subsequent encounter. 7/31/19  Now 9 days out from injury.  Since last visit on 8/2/2019 patient denies swelling, pain or paresthesias, splint removed and repeat radiograph taken prior to seeing the patient.     **  Pain located in radial wrist with radial deviation, initially on removing the splint.  With further testing, that improved.    Review of Systems  All other systems reviewed and are negative unless noted above.    Patient Active Problem List   Diagnosis     CARDIOVASCULAR SCREENING; LDL GOAL LESS THAN 160   PMHx above ^    PSHx unchanged.      This document serves as a record of the services and decisions personally performed and made by DO SONG Dozier. It was created on his behalf by London Duarte, a trained medical scribe. The creation of this document is based the provider's statements to the medical scribe.    Scribe London Duarte 9:50 AM 8/9/2019      Objective  /68   Ht 1.702 m (5' 7\")   Wt 77.1 kg (170 lb)   BMI 26.63 kg/m      GENERAL APPEARANCE: healthy, alert and no distress   GAIT: NORMAL  SKIN: no suspicious lesions or rashes  NEURO: Normal strength and tone, mentation intact and speech normal  PSYCH:  mentation appears normal and affect normal/bright  HEENT: no scleral icterus  CV: no extremity edema  RESP: nonlabored breathing    Exam:    Hand/wrist (Left):    Inspection:  Moderate diffuse swelling and ecchymosis remain, somewhat improved compared to last visit    Motion:  Forearm pronation has minimal limitation; lacking 5 degrees with protonation  Forearm supination grossly full with radial pain   Wrist flexion full  Wrist extension full  Wrist radial deviation with pain in radial wrist  Wrist ulnar deviation full  Digit motion full    Sensation:  Grossly intact    Radial " pulses normal, +2/4, capillary refill brisk.    Palpation:  No focal tenderness      Radiology  Visualized radiographs of left forearm obtained today, and reviewed the images with the patient.  Impression: Two views of the left forearm demonstrate once again the previously noted ulnar fracture.  Mild to moderate displacement remains, perhaps slightly increased on AP view when compared to prior images from 7/31/2019.  No apparent bony healing to date.      Assessment:  1. Closed displaced transverse fracture of shaft of left ulna with routine healing, subsequent encounter        Plan:  Discussed the assessment with the patient.,  Clinically, he is doing well with improving swelling and bruising And overall has good motion at the elbow and wrist in light of his injury.    Radiologic images reviewed and discussed with patient today.  Degree of displacement remains less than 50% of width of the bone.  Advised can check in with Ortho surgeon regarding alignment, but with his current function, likely will continue with immobilization and current management  Medical Equipment: Discussed options: Splint, cast, fracture brace, custom splint. Continue with splint, new splint applied today.  Still with diffuse swelling, if cast placed likely would become loose by recheck.  Updated note was written for a gym membership   Unable to use gym membership.  Letter provided.  Follow up: 1 week for recheck and repeat films to assess progress and radiographic stability, sooner if needed.  Will contact him in the meantime if any indication to see orthopedic surgeon.  Questions answered. The patient indicates understanding of these issues and agrees with the plan.    Aries Barone DO, CAQ      Cast/splint application  Date/Time: 8/9/2019 10:13 AM  Performed by: Ann Arboleda ATC  Authorized by: Aries Barone DO     Consent:     Consent obtained:  Verbal    Consent given by:  Patient    Risks discussed:  Discoloration,  numbness, pain and swelling    Alternatives discussed:  Alternative treatment  Pre-procedure details:     Sensation:  Normal  Procedure details:     Laterality:  Left    Location:  Arm    Arm:  L lower arm    Strapping: no      Splint type:  Short arm (static)    Supplies:  Fiberglass  Post-procedure details:     Pain:  Improved    Pain level:  0/10    Sensation:  Normal    Patient tolerance of procedure:  Tolerated well, no immediate complications    Patient provided with cast or splint care instructions: Yes    Comments:      Reverse sugar tong          Disclaimer: This note consists of symbols derived from keyboarding, dictation and/or voice recognition software. As a result, there may be errors in the script that have gone undetected. Please consider this when interpreting information found in this chart.    The information in this document, created by a scribe for me, accurately reflects the services I personally performed and the decisions made by me. I have reviewed and approved this document for accuracy.

## 2019-08-09 NOTE — LETTER
[facility] [place]  [address]  [place] [address]  Phone: [phone]  Fax: [phone]      August 9, 2019      RE: Jarret Eddy  [address] [address] [address]        To whom it may concern:    Jarret MOFFETT Eddy is under my professional care. Due to an injury, advise rest from physical exercise, including use of gym or other workouts at this time.        Sincerely,                Aries WHITESIDE

## 2019-08-14 ENCOUNTER — TELEPHONE (OUTPATIENT)
Dept: ORTHOPEDICS | Facility: CLINIC | Age: 59
End: 2019-08-14

## 2019-08-14 NOTE — TELEPHONE ENCOUNTER
LVM for patient that Dr. Dunaway had spoke with one of the orthopedic surgeons, and it is appropriate to continue with conservative treatment.  He should keep is appointment on 8/16/19 with anticipation of being casted.  He can call back to discuss if he has questions.    Ann Arboleda MS ATC

## 2019-08-16 ENCOUNTER — OFFICE VISIT (OUTPATIENT)
Dept: ORTHOPEDICS | Facility: CLINIC | Age: 59
End: 2019-08-16
Payer: COMMERCIAL

## 2019-08-16 ENCOUNTER — ANCILLARY PROCEDURE (OUTPATIENT)
Dept: GENERAL RADIOLOGY | Facility: CLINIC | Age: 59
End: 2019-08-16
Attending: PEDIATRICS
Payer: COMMERCIAL

## 2019-08-16 VITALS
DIASTOLIC BLOOD PRESSURE: 70 MMHG | HEIGHT: 67 IN | BODY MASS INDEX: 26.68 KG/M2 | WEIGHT: 170 LBS | SYSTOLIC BLOOD PRESSURE: 120 MMHG

## 2019-08-16 DIAGNOSIS — S52.222D CLOSED DISPLACED TRANSVERSE FRACTURE OF SHAFT OF LEFT ULNA WITH ROUTINE HEALING, SUBSEQUENT ENCOUNTER: Primary | ICD-10-CM

## 2019-08-16 DIAGNOSIS — S52.222D CLOSED DISPLACED TRANSVERSE FRACTURE OF SHAFT OF LEFT ULNA WITH ROUTINE HEALING, SUBSEQUENT ENCOUNTER: ICD-10-CM

## 2019-08-16 PROCEDURE — 73090 X-RAY EXAM OF FOREARM: CPT | Mod: LT | Performed by: PEDIATRICS

## 2019-08-16 PROCEDURE — 99207 ZZC FRACTURE CARE IN GLOBAL PERIOD: CPT | Performed by: PEDIATRICS

## 2019-08-16 ASSESSMENT — MIFFLIN-ST. JEOR: SCORE: 1544.74

## 2019-08-16 NOTE — PROGRESS NOTES
"Sports Medicine Clinic Visit    PCP: Domingo Gleason    Jarret Eddy is a 58 year old male who is seen in f/u up for Closed displaced transverse fracture of shaft of left ulna with routine healing, subsequent encounter.7/31/19  Now 16 days  out from injury.  Since last visit on 8/14/2019 patient denies swelling, pain or paresthesias, splint removed and repeat radiograph taken prior to seeing the patient.     **  Occasional pain on lateral side of forearm. Currently has some soreness.    PSHx: CTR and trigger release    Review of Systems  All other systems reviewed and are negative unless noted above.    Patient Active Problem List   Diagnosis     CARDIOVASCULAR SCREENING; LDL GOAL LESS THAN 160   PMHx above    No pertinent past surgical history    This document serves as a record of the services and decisions personally performed and made by DO SONG Dozier. It was created on his behalf by London Duarte, a trained medical scribe. The creation of this document is based the provider's statements to the medical scribe.    Scribe London Duarte 9:30 AM 8/16/2019      Objective  /70   Ht 1.702 m (5' 7\")   Wt 77.1 kg (170 lb)   BMI 26.63 kg/m      GENERAL APPEARANCE: healthy, alert and no distress   GAIT: NORMAL  SKIN: no suspicious lesions or rashes  NEURO: Normal strength and tone, mentation intact and speech normal  PSYCH:  mentation appears normal and affect normal/bright  HEENT: no scleral icterus  CV: no extremity edema  RESP: nonlabored breathing    Exam:  Left arm:  Swelling and ecchymosis improving.  Minimal swelling remains.  Regional pulses normal.  Capillary refill less than 2 seconds.  Normal sensation noted.   Full digit motion.  Still with good forearm rotation.  Minimal limitation wrist motion, with stiffness.    Radiology  Visualized radiographs of left forearm obtained today, and reviewed the images with the patient.  Impression: Images of the left forearm demonstrate again the " displaced ulna fracture.  There appears to be faint callus formation at the margins of the fracture on AP view, compared to prior images from 8/9/2019.  No interval change in alignment.      Assessment:  1. Closed displaced transverse fracture of shaft of left ulna with routine healing, subsequent encounter        Plan:  Discussed the assessment with the patient. Reviewed with him my discussion with orthopedic surgery following last visit; in particular, discussed with ortho surgeon no clear indication for surgery given fracture alignment and current function.    Radiologic images reviewed and discussed with patient today  Discussed continued splinting, long arm and custom splinting were reviewed; Reapplied previous splint, patient contacted insurance to discuss coverage through custom splinting. Following that, he desires to have custom removable fracture splint through OT. Referral placed. Continue current splint and cares in meantime.  Follow up: 2-3 weeks, repeat x-ray left forearm, sooner prn.   Questions answered. The patient indicates understanding of these issues and agrees with the plan.    Aries Barone, , CAQ            Disclaimer: This note consists of symbols derived from keyboarding, dictation and/or voice recognition software. As a result, there may be errors in the script that have gone undetected. Please consider this when interpreting information found in this chart.    The information in this document, created by a scribe for me, accurately reflects the services I personally performed and the decisions made by me. I have reviewed and approved this document for accuracy.

## 2019-08-16 NOTE — LETTER
"    8/16/2019         RE: Jarret Eddy  [address]  [place] [address]        Dear Colleague,    Thank you for referring your patient, Jarret Eddy, to the [facility] [place]. Please see a copy of my visit note below.    Sports Medicine Clinic Visit    PCP: Domingo Gleason    Jarret Eddy is a 58 year old male who is seen in f/u up for Closed displaced transverse fracture of shaft of left ulna with routine healing, subsequent encounter.7/31/19  Now 16 days  out from injury.  Since last visit on 8/14/2019 patient denies swelling, pain or paresthesias, splint removed and repeat radiograph taken prior to seeing the patient.     **  Occasional pain on lateral side of forearm. Currently has some soreness.    PSHx: CTR and trigger release    Review of Systems  All other systems reviewed and are negative unless noted above.    Patient Active Problem List   Diagnosis     CARDIOVASCULAR SCREENING; LDL GOAL LESS THAN 160   PMHx above    No pertinent past surgical history    This document serves as a record of the services and decisions personally performed and made by DO SONG Dozier. It was created on his behalf by London Duarte, a trained medical scribe. The creation of this document is based the provider's statements to the medical scribe.    Scribe London Duarte 9:30 AM 8/16/2019      Objective  /70   Ht 1.702 m (5' 7\")   Wt 77.1 kg (170 lb)   BMI 26.63 kg/m       GENERAL APPEARANCE: healthy, alert and no distress   GAIT: NORMAL  SKIN: no suspicious lesions or rashes  NEURO: Normal strength and tone, mentation intact and speech normal  PSYCH:  mentation appears normal and affect normal/bright  HEENT: no scleral icterus  CV: no extremity edema  RESP: nonlabored breathing    Exam:  Left arm:  Swelling and ecchymosis improving.  Minimal swelling remains.  Regional pulses normal.  Capillary refill less than 2 seconds.  Normal sensation noted.   Full digit " motion.  Still with good forearm rotation.  Minimal limitation wrist motion, with stiffness.    Radiology  Visualized radiographs of left forearm obtained today, and reviewed the images with the patient.  Impression: Images of the left forearm demonstrate again the displaced ulna fracture.  There appears to be faint callus formation at the margins of the fracture on AP view, compared to prior images from 8/9/2019.  No interval change in alignment.      Assessment:  1. Closed displaced transverse fracture of shaft of left ulna with routine healing, subsequent encounter        Plan:  Discussed the assessment with the patient. Reviewed with him my discussion with orthopedic surgery following last visit; in particular, discussed with ortho surgeon no clear indication for surgery given fracture alignment and current function.    Radiologic images reviewed and discussed with patient today  Discussed continued splinting, long arm and custom splinting were reviewed; Reapplied previous splint, patient contacted insurance to discuss coverage through custom splinting. Following that, he desires to have custom removable fracture splint through OT. Referral placed. Continue current splint and cares in meantime.  Follow up: 2-3 weeks, repeat x-ray left forearm, sooner prn.   Questions answered. The patient indicates understanding of these issues and agrees with the plan.    Aries Barone, , CAQ            Disclaimer: This note consists of symbols derived from keyboarding, dictation and/or voice recognition software. As a result, there may be errors in the script that have gone undetected. Please consider this when interpreting information found in this chart.    The information in this document, created by a scribe for me, accurately reflects the services I personally performed and the decisions made by me. I have reviewed and approved this document for accuracy.         Again, thank you for allowing me to participate in  the care of your patient.        Sincerely,        Aries Barone, DO

## 2019-08-16 NOTE — PATIENT INSTRUCTIONS
Reverse sugartong splint reapplied today    Discuss custom bracing with insurance company; custom fracture brace for forearm    Referral for brace placed

## 2019-08-21 ENCOUNTER — THERAPY VISIT (OUTPATIENT)
Dept: OCCUPATIONAL THERAPY | Facility: CLINIC | Age: 59
End: 2019-08-21
Payer: COMMERCIAL

## 2019-08-21 DIAGNOSIS — M79.632 PAIN OF LEFT FOREARM: Primary | ICD-10-CM

## 2019-08-21 DIAGNOSIS — S52.222D CLOSED DISPLACED TRANSVERSE FRACTURE OF SHAFT OF LEFT ULNA WITH ROUTINE HEALING, SUBSEQUENT ENCOUNTER: ICD-10-CM

## 2019-08-21 PROCEDURE — 97760 ORTHOTIC MGMT&TRAING 1ST ENC: CPT | Mod: GO | Performed by: OCCUPATIONAL THERAPIST
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
## 2019-09-06 ENCOUNTER — OFFICE VISIT (OUTPATIENT)
Dept: ORTHOPEDICS | Facility: CLINIC | Age: 59
End: 2019-09-06
Payer: COMMERCIAL

## 2019-09-06 ENCOUNTER — ANCILLARY PROCEDURE (OUTPATIENT)
Dept: GENERAL RADIOLOGY | Facility: CLINIC | Age: 59
End: 2019-09-06
Attending: PEDIATRICS
Payer: COMMERCIAL

## 2019-09-06 VITALS
BODY MASS INDEX: 26.68 KG/M2 | DIASTOLIC BLOOD PRESSURE: 68 MMHG | WEIGHT: 170 LBS | SYSTOLIC BLOOD PRESSURE: 116 MMHG | HEIGHT: 67 IN

## 2019-09-06 DIAGNOSIS — S52.222D CLOSED DISPLACED TRANSVERSE FRACTURE OF SHAFT OF LEFT ULNA WITH ROUTINE HEALING, SUBSEQUENT ENCOUNTER: Primary | ICD-10-CM

## 2019-09-06 DIAGNOSIS — S52.222D CLOSED DISPLACED TRANSVERSE FRACTURE OF SHAFT OF LEFT ULNA WITH ROUTINE HEALING, SUBSEQUENT ENCOUNTER: ICD-10-CM

## 2019-09-06 PROCEDURE — 73090 X-RAY EXAM OF FOREARM: CPT | Mod: LT | Performed by: PEDIATRICS

## 2019-09-06 PROCEDURE — 99207 ZZC FRACTURE CARE IN GLOBAL PERIOD: CPT | Performed by: PEDIATRICS

## 2019-09-06 ASSESSMENT — MIFFLIN-ST. JEOR: SCORE: 1544.74

## 2019-09-06 NOTE — LETTER
"    9/6/2019         RE: Jarret Eddy  [address]  [place] [address]        Dear Colleague,    Thank you for referring your patient, Jarret Eddy, to the [facility]. Please see a copy of my visit note below.    Sports Medicine Clinic Visit    PCP: Domingo Gleason    Jarret Eddy is a 58 year old male who is seen in f/u up for Closed displaced transverse fracture of shaft of left ulna with routine healing, subsequent encounter.  7/31/19 Now about 5.5 weeks out from injury.  Since last visit on 8/16/2019 patient denies swelling, pain or paresthesias and repeat radiograph taken prior to seeing the patient.   Has had a custom forearm brace.  Feels the brace is more convenient for him.      **  Patient reports pain close to fracture site with forearm rotations while wearing the custom splint. Otherwise, no pain at rest. No pain in splint.  No interval injury.  DOI: 7/31/2019      Review of Systems  All other systems reviewed and are negative unless noted above.      Patient Active Problem List   Diagnosis     CARDIOVASCULAR SCREENING; LDL GOAL LESS THAN 160   PMHx above    No pertinent past surgical history     This document serves as a record of the services and decisions personally performed and made by DO SONG Dozier. It was created on his behalf by London Duarte, a trained medical scribe. The creation of this document is based the provider's statements to the medical scribe.    Sierra Duarte 9:23 AM 9/6/2019      Objective  /68   Ht 1.702 m (5' 7\")   Wt 77.1 kg (170 lb)   BMI 26.63 kg/m       GENERAL APPEARANCE: healthy, alert and no distress   GAIT: NORMAL  SKIN: no suspicious lesions or rashes  NEURO: Normal strength and tone, mentation intact and speech normal  PSYCH:  mentation appears normal and affect normal/bright  HEENT: no scleral icterus  CV: distal perfusion intact  RESP: nonlabored breathing    Exam:  Left forearm:  No clear " swelling/ecchymosis.  Notes mild discomfort end range of forearm supination. Otherwise, no pain with forearm pronation, or with wrist/digit motion.    Radiology  Visualized radiographs of left forearm obtained today, and reviewed the images with the patient.  Impression: Two views of the left forearm demonstrate again the previously noted displaced ulna fracture. There has been interval development of slightly more callus compared to prior images from 8/16/19. Otherwise, there has been no significant interval change.      Assessment:  1. Closed displaced transverse fracture of shaft of left ulna with routine healing, subsequent encounter        Plan:  Discussed the assessment with the patient. Reviewed course. Some healing present, but far from complete radiographically.    Radiologic images reviewed and discussed with patient today  Support: Patient will continue with custom splint from OT. Reviewed routine use.  Activity modification discussed. Avoid pain, particularly with forearm rotation. We discussed can modify splint to limit forearm rotation as well, but he does not desire, prefers to modify his activities on own.  Follow up: 2.5-3 weeks with repeated left forearm x-ray upon recheck. (around 8 weeks from injury)  Questions answered. The patient indicates understanding of these issues and agrees with the plan.    Aries Barone DO, CAQ            Disclaimer: This note consists of symbols derived from keyboarding, dictation and/or voice recognition software. As a result, there may be errors in the script that have gone undetected. Please consider this when interpreting information found in this chart.    The information in this document, created by a scribe for me, accurately reflects the services I personally performed and the decisions made by me. I have reviewed and approved this document for accuracy.     Again, thank you for allowing me to participate in the care of your patient.         Sincerely,        Aries Barone, DO

## 2019-09-06 NOTE — PROGRESS NOTES
"Sports Medicine Clinic Visit    PCP: Domingo Gleason    Jarret Eddy is a 58 year old male who is seen in f/u up for Closed displaced transverse fracture of shaft of left ulna with routine healing, subsequent encounter.  7/31/19 Now about 5.5 weeks out from injury.  Since last visit on 8/16/2019 patient denies swelling, pain or paresthesias and repeat radiograph taken prior to seeing the patient.   Has had a custom forearm brace.  Feels the brace is more convenient for him.      **  Patient reports pain close to fracture site with forearm rotations while wearing the custom splint. Otherwise, no pain at rest. No pain in splint.  No interval injury.  DOI: 7/31/2019      Review of Systems  All other systems reviewed and are negative unless noted above.      Patient Active Problem List   Diagnosis     CARDIOVASCULAR SCREENING; LDL GOAL LESS THAN 160   PMHx above    No pertinent past surgical history     This document serves as a record of the services and decisions personally performed and made by DO SONG Dozier. It was created on his behalf by London Duarte, a trained medical scribe. The creation of this document is based the provider's statements to the medical scribe.    Scribjael Duarte 9:23 AM 9/6/2019      Objective  /68   Ht 1.702 m (5' 7\")   Wt 77.1 kg (170 lb)   BMI 26.63 kg/m      GENERAL APPEARANCE: healthy, alert and no distress   GAIT: NORMAL  SKIN: no suspicious lesions or rashes  NEURO: Normal strength and tone, mentation intact and speech normal  PSYCH:  mentation appears normal and affect normal/bright  HEENT: no scleral icterus  CV: distal perfusion intact  RESP: nonlabored breathing    Exam:  Left forearm:  No clear swelling/ecchymosis.  Notes mild discomfort end range of forearm supination. Otherwise, no pain with forearm pronation, or with wrist/digit motion.    Radiology  Visualized radiographs of left forearm obtained today, and reviewed the images with the patient.  " Impression: Two views of the left forearm demonstrate again the previously noted displaced ulna fracture. There has been interval development of slightly more callus compared to prior images from 8/16/19. Otherwise, there has been no significant interval change.      Assessment:  1. Closed displaced transverse fracture of shaft of left ulna with routine healing, subsequent encounter        Plan:  Discussed the assessment with the patient. Reviewed course. Some healing present, but far from complete radiographically.    Radiologic images reviewed and discussed with patient today  Support: Patient will continue with custom splint from OT. Reviewed routine use.  Activity modification discussed. Avoid pain, particularly with forearm rotation. We discussed can modify splint to limit forearm rotation as well, but he does not desire, prefers to modify his activities on own.  Follow up: 2.5-3 weeks with repeated left forearm x-ray upon recheck. (around 8 weeks from injury)  Questions answered. The patient indicates understanding of these issues and agrees with the plan.    Aries Barone, , CAQ            Disclaimer: This note consists of symbols derived from keyboarding, dictation and/or voice recognition software. As a result, there may be errors in the script that have gone undetected. Please consider this when interpreting information found in this chart.    The information in this document, created by a scribe for me, accurately reflects the services I personally performed and the decisions made by me. I have reviewed and approved this document for accuracy.

## 2019-09-10 ENCOUNTER — MYC MEDICAL ADVICE (OUTPATIENT)
Dept: ORTHOPEDICS | Facility: CLINIC | Age: 59
End: 2019-09-10

## 2019-09-16 ENCOUNTER — ANCILLARY PROCEDURE (OUTPATIENT)
Dept: CT IMAGING | Facility: CLINIC | Age: 59
End: 2019-09-16
Attending: PEDIATRICS
Payer: COMMERCIAL

## 2019-09-16 ENCOUNTER — ANCILLARY PROCEDURE (OUTPATIENT)
Dept: GENERAL RADIOLOGY | Facility: CLINIC | Age: 59
End: 2019-09-16
Attending: PEDIATRICS
Payer: COMMERCIAL

## 2019-09-16 ENCOUNTER — OFFICE VISIT (OUTPATIENT)
Dept: ORTHOPEDICS | Facility: CLINIC | Age: 59
End: 2019-09-16
Payer: COMMERCIAL

## 2019-09-16 VITALS
HEIGHT: 67 IN | DIASTOLIC BLOOD PRESSURE: 78 MMHG | WEIGHT: 172 LBS | SYSTOLIC BLOOD PRESSURE: 120 MMHG | BODY MASS INDEX: 27 KG/M2

## 2019-09-16 DIAGNOSIS — S52.222D CLOSED DISPLACED TRANSVERSE FRACTURE OF SHAFT OF LEFT ULNA WITH ROUTINE HEALING, SUBSEQUENT ENCOUNTER: Primary | ICD-10-CM

## 2019-09-16 DIAGNOSIS — S52.222D CLOSED DISPLACED TRANSVERSE FRACTURE OF SHAFT OF LEFT ULNA WITH ROUTINE HEALING, SUBSEQUENT ENCOUNTER: ICD-10-CM

## 2019-09-16 PROCEDURE — 73200 CT UPPER EXTREMITY W/O DYE: CPT | Mod: TC

## 2019-09-16 PROCEDURE — 73090 X-RAY EXAM OF FOREARM: CPT | Mod: LT | Performed by: PEDIATRICS

## 2019-09-16 PROCEDURE — 99207 ZZC FRACTURE CARE IN GLOBAL PERIOD: CPT | Performed by: PEDIATRICS

## 2019-09-16 ASSESSMENT — MIFFLIN-ST. JEOR: SCORE: 1553.82

## 2019-09-16 NOTE — PATIENT INSTRUCTIONS
Advanced imaging is done by appointment. Some insurance require a prior authorization to be completed which may delay the time until you are able to schedule your appointment.Please call [facility]: [phone] to schedule your CT.  Depending on your availability you can usually schedule within the next 1-2 days.    The clinic will call you with results, if you have not heard from the clinic within 3-4 days following your CT please contact us at the number listed below.   If you have any further questions for your physician or physician s care team you can call [phone] and use option 3 to leave a voice message. Calls received during business hours will be returned same day.

## 2019-09-16 NOTE — LETTER
"    9/16/2019         RE: Jarret Eddy  [address]  [place] [address]        Dear Colleague,    Thank you for referring your patient, Jarret Eddy, to the [facility]. Please see a copy of my visit note below.    Sports Medicine Clinic Visit    PCP: Domingo Gleason    Jarret Eddy is a 58 year old male who is seen in f/u up for Closed displaced transverse fracture of shaft of left ulna with routine healing, subsequent encounter. 7/31/16 Now almost 7 weeks out from injury.  Since last visit on 9/6/2019 patient denies swelling, pain or paresthesias, splint removed and repeat radiograph taken prior to seeing the patient.     **  Patient feels that injury has not progressed to his standard. Has been taking Vitamin D supplements  No interval injury.  DOI: 7/31/2019    Review of Systems  All other systems reviewed and are negative unless noted above.    Patient Active Problem List   Diagnosis     CARDIOVASCULAR SCREENING; LDL GOAL LESS THAN 160   PMHx above    No pertinent past surgical history     This document serves as a record of the services and decisions personally performed and made by DO SONG Dozier. It was created on his behalf by London Duarte, a trained medical scribe. The creation of this document is based the provider's statements to the medical scribe.    Scribjael Duarte 9:23 AM 9/6/2019      Objective  /78   Ht 1.702 m (5' 7\")   Wt 78 kg (172 lb)   BMI 26.94 kg/m       GENERAL APPEARANCE: healthy, alert and no distress   GAIT: NORMAL  SKIN: no suspicious lesions or rashes  NEURO: Normal strength and tone, mentation intact and speech normal  PSYCH:  mentation appears normal and affect normal/bright  HEENT: no scleral icterus  CV: distal perfusion intact  RESP: nonlabored breathing    Exam:  Min ulnar forearm swelling  Min pain with end of forearm rotation  Digit motion full      Radiology  Visualized radiographs of left forearm " obtained today, and reviewed the images with the patient.  Impression: Two views of the left forearm demonstrate once again the previously noted displaced ulna fracture. There has been no significant overall change when compared to prior images from 9/6/19.      Compared with prior radiographs from 9/06/2019    Two views of the left forearm demonstrate again the previously noted   displaced ulna fracture. There has been interval development of slightly   more callus compared to prior images from 8/16/19. Otherwise, there has   been no significant interval change.    Aries Barone DO, CAQ    Assessment:  1. Closed displaced transverse fracture of shaft of left ulna with routine healing, subsequent encounter        Plan:  Discussed the assessment with the patient. Reviewed course. Minimal change on x-ray over time. Will proceed with advanced imaging to assess healing. If fair healing taking place, then likely continue immobilization and monitoring. If minimal/no healing, then discussed potential referral to ortho surgeon (though understanding additional considerations may be immobilization and monitoring, also bone stimulator).      Radiologic images reviewed and discussed with patient today  Discussed option of observing and monitoring with splinting, rest and Over the counter supplements, patient preferred to have a CT scan done.   Imaging: CT scan ordered of the left forearm  Discussed option of orthopedic surgery; pending CT scan results  Medical Equipment: Discussed option of using a bone stimulator, may be future consideration  Continue with current fracture brace, routine use; also rest  Follow up: Clinic will contact patient with CT results  Questions answered. The patient indicates understanding of these issues and agrees with the plan.      Aries Barone DO, SONG          Disclaimer: This note consists of symbols derived from keyboarding, dictation and/or voice recognition software. As a result,  there may be errors in the script that have gone undetected. Please consider this when interpreting information found in this chart.    The information in this document, created by a scribe for me, accurately reflects the services I personally performed and the decisions made by me. I have reviewed and approved this document for accuracy.       Again, thank you for allowing me to participate in the care of your patient.        Sincerely,        Aries Barone, DO

## 2019-09-16 NOTE — PROGRESS NOTES
"Sports Medicine Clinic Visit    PCP: Domingo Gleason    Jarret Eddy is a 58 year old male who is seen in f/u up for Closed displaced transverse fracture of shaft of left ulna with routine healing, subsequent encounter. 7/31/16 Now almost 7 weeks out from injury.  Since last visit on 9/6/2019 patient denies swelling, pain or paresthesias, splint removed and repeat radiograph taken prior to seeing the patient.     **  Patient feels that injury has not progressed to his standard. Has been taking Vitamin D supplements  No interval injury.  DOI: 7/31/2019    Review of Systems  All other systems reviewed and are negative unless noted above.    Patient Active Problem List   Diagnosis     CARDIOVASCULAR SCREENING; LDL GOAL LESS THAN 160   PMHx above    No pertinent past surgical history     This document serves as a record of the services and decisions personally performed and made by DO SONG Dozier. It was created on his behalf by London Duarte, a trained medical scribe. The creation of this document is based the provider's statements to the medical scribe.    Scribe London Duarte 9:23 AM 9/6/2019      Objective  /78   Ht 1.702 m (5' 7\")   Wt 78 kg (172 lb)   BMI 26.94 kg/m      GENERAL APPEARANCE: healthy, alert and no distress   GAIT: NORMAL  SKIN: no suspicious lesions or rashes  NEURO: Normal strength and tone, mentation intact and speech normal  PSYCH:  mentation appears normal and affect normal/bright  HEENT: no scleral icterus  CV: distal perfusion intact  RESP: nonlabored breathing    Exam:  Min ulnar forearm swelling  Min pain with end of forearm rotation  Digit motion full      Radiology  Visualized radiographs of left forearm obtained today, and reviewed the images with the patient.  Impression: Two views of the left forearm demonstrate once again the previously noted displaced ulna fracture. There has been no significant overall change when compared to prior images from " 9/6/19.      Compared with prior radiographs from 9/06/2019    Two views of the left forearm demonstrate again the previously noted   displaced ulna fracture. There has been interval development of slightly   more callus compared to prior images from 8/16/19. Otherwise, there has   been no significant interval change.    Aries Barone DO, SONG    Assessment:  1. Closed displaced transverse fracture of shaft of left ulna with routine healing, subsequent encounter        Plan:  Discussed the assessment with the patient. Reviewed course. Minimal change on x-ray over time. Will proceed with advanced imaging to assess healing. If fair healing taking place, then likely continue immobilization and monitoring. If minimal/no healing, then discussed potential referral to ortho surgeon (though understanding additional considerations may be immobilization and monitoring, also bone stimulator).      Radiologic images reviewed and discussed with patient today  Discussed option of observing and monitoring with splinting, rest and Over the counter supplements, patient preferred to have a CT scan done.   Imaging: CT scan ordered of the left forearm  Discussed option of orthopedic surgery; pending CT scan results  Medical Equipment: Discussed option of using a bone stimulator, may be future consideration  Continue with current fracture brace, routine use; also rest  Follow up: Clinic will contact patient with CT results  Questions answered. The patient indicates understanding of these issues and agrees with the plan.      Aries Barone DO, SONG          Disclaimer: This note consists of symbols derived from keyboarding, dictation and/or voice recognition software. As a result, there may be errors in the script that have gone undetected. Please consider this when interpreting information found in this chart.    The information in this document, created by a scribe for me, accurately reflects the services I personally performed  and the decisions made by me. I have reviewed and approved this document for accuracy.

## 2019-09-17 ENCOUNTER — TELEPHONE (OUTPATIENT)
Dept: ORTHOPEDICS | Facility: CLINIC | Age: 59
End: 2019-09-17

## 2019-09-17 DIAGNOSIS — S52.222G: Primary | ICD-10-CM

## 2019-09-17 NOTE — TELEPHONE ENCOUNTER
Results for orders placed or performed in visit on 09/16/19   CT Forearm Left w/o Contrast    Narrative    CT FOREARM LEFT WITHOUT CONTRAST September 16, 2019 5:19 PM     HISTORY: Fracture, forearm, imaging follow up. Evaluate ulna fracture  healing. Closed displaced transverse fracture of shaft of left ulna  with routine healing, subsequent encounter. Fracture on 7/31/2019.    TECHNIQUE: Axial images with reconstructions. Radiation dose for this  scan was reduced using automated exposure control, adjustment of the  mA and/or kV according to patient size, or iterative reconstruction  technique.      COMPARISON: Radiograph 9/16/2019.    FINDINGS: Again there is a fracture of the fibular distal diaphysis.  There is mild displacement. There is slight apex radial and apex volar  angulation. There is some callus formation but no evidence of solid  bony union. Chondrocalcinosis of the wrist, consistent with calcium  pyrophosphate deposition disease. Carpal resorptive versus erosive  changes. Prominent degenerative arthrosis at the triscaphe (STT)  joint. Elbow osteoarthritic changes with multiple loose bodies.  Degenerative change at the proximal radioulnar joint.        Impression    IMPRESSION:  1. Ulnar fracture, without solid bony union.  2. Additional findings discussed above.    CAROLINE ESCOBEDO MD

## 2019-09-18 NOTE — TELEPHONE ENCOUNTER
Minimal callus as noted, no significant bony union.  Options: 1) referral to ortho surgeon (discussed this likely next step at his visit); 2) trial bone stimulator (subject to insurance coverage).  Would offer referral on to discuss with ortho given our discussion last visit.  Thanks.  Aries Barone DO, CAQ

## 2019-09-18 NOTE — TELEPHONE ENCOUNTER
Patient returned call.  Discussed results and treatment options.  He would like to see a surgeon.  Referral placed  Ann Arboleda MS ATC

## 2019-09-23 ENCOUNTER — OFFICE VISIT (OUTPATIENT)
Dept: ORTHOPEDICS | Facility: CLINIC | Age: 59
End: 2019-09-23
Payer: COMMERCIAL

## 2019-09-23 VITALS
HEIGHT: 67 IN | BODY MASS INDEX: 27 KG/M2 | HEART RATE: 61 BPM | WEIGHT: 172 LBS | RESPIRATION RATE: 16 BRPM | DIASTOLIC BLOOD PRESSURE: 69 MMHG | SYSTOLIC BLOOD PRESSURE: 135 MMHG

## 2019-09-23 DIAGNOSIS — S52.222A CLOSED DISPLACED TRANSVERSE FRACTURE OF SHAFT OF LEFT ULNA, INITIAL ENCOUNTER: Primary | ICD-10-CM

## 2019-09-23 PROCEDURE — 99203 OFFICE O/P NEW LOW 30 MIN: CPT | Performed by: ORTHOPAEDIC SURGERY

## 2019-09-23 ASSESSMENT — MIFFLIN-ST. JEOR: SCORE: 1553.82

## 2019-09-23 NOTE — PATIENT INSTRUCTIONS
Surgery:  Open-reduction, internal fixation left ulna shaft fracture.    Preop physical with primary physician is needed within 30 days of the surgery.  Nothing to eat or drink for 8 hours before surgery.  For same day surgery you need a ride.  Someone should stay with you for 12 hours afterward.  Stop blood thinners 5 days before surgery (aspirin, warfarin, anti-inflammatories).      Call [phone] to schedule your surgery.
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
## 2019-09-24 ENCOUNTER — TELEPHONE (OUTPATIENT)
Dept: ORTHOPEDICS | Facility: CLINIC | Age: 59
End: 2019-09-24

## 2019-09-24 PROBLEM — S52.222A CLOSED DISPLACED TRANSVERSE FRACTURE OF SHAFT OF LEFT ULNA: Status: ACTIVE | Noted: 2019-08-21

## 2019-09-26 ENCOUNTER — MYC MEDICAL ADVICE (OUTPATIENT)
Dept: ORTHOPEDICS | Facility: CLINIC | Age: 59
End: 2019-09-26

## 2019-10-02 ENCOUNTER — TRANSFERRED RECORDS (OUTPATIENT)
Dept: HEALTH INFORMATION MANAGEMENT | Facility: CLINIC | Age: 59
End: 2019-10-02

## 2019-10-02 ENCOUNTER — PREP FOR PROCEDURE (OUTPATIENT)
Dept: ORTHOPEDICS | Facility: CLINIC | Age: 59
End: 2019-10-02

## 2019-10-02 DIAGNOSIS — S52.222A: Primary | ICD-10-CM

## 2019-10-02 NOTE — PROGRESS NOTES
Question Answer Comment   Procedure name(s) - multi select Open reduction internal fixation left ulna shaft    Is this a multi surgeon case? No    Laterality Left    Reason for procedure left ulna shaft fracture with nonunion    Urgency of Surgery Patient Choice/Next Available    Location of Case: [facility]    Surgeon Procedure Time (incision to closure) in minutes (per procedure as applicable) 60    Note:  Surgical Case Time Needed (in minutes)   Patient Class (for admit prior to surgery, specify number of days in comments): Same day (surgery center outpatient)    Anesthesia Choice    Anesthesia Special Needs Other (see comments) per anesthesia   H&P To Be Completed By: PCP    Post-Op Appointment Other - comments 10-14 days   Procedure will be performed or supervised by: Shaun Roberts MD    Surgical Assistant PALEANNA    Vendor Needed? No    Other/Additional Comments, as needed: will need croutons or bone chips, small frag plate

## 2019-11-01 ENCOUNTER — TELEPHONE (OUTPATIENT)
Dept: ORTHOPEDICS | Facility: CLINIC | Age: 59
End: 2019-11-01

## 2019-11-01 NOTE — TELEPHONE ENCOUNTER
LVM for patient to discuss appointment scheduled on 11/2/19.  Unsure if patient had surgery as unable to open media from [facility].  Ann Arboleda MS ATC

## 2019-11-01 NOTE — TELEPHONE ENCOUNTER
Patient returned call, he did not have surgery.  Will keep appointment for tomorrow    Ann Arboleda MS, ATC

## 2019-11-01 NOTE — PROGRESS NOTES
"Sports Medicine Clinic Visit    PCP: Domingo Gleason    Jarret Eddy is a 59 year old male who is seen in f/u up for Closed displaced transverse fracture of shaft of left ulna with delayed healing, subsequent encounter. 7/31/19  Now 13 weeks out from injury.  Since last visit on 9/16/2019 patient denies swelling, pain or paresthesias, splint removed and repeat radiograph taken prior to seeing the patient.     **  Pain improved compared to previous.  No surgery with Dr Roberts due to timing; unable to do until mid-November.  He also saw Dr Shen through [facility]; advised against surgery.    Chart reviewed; see note from Dr Shen.    Review of Systems  All other systems reviewed and are negative unless noted above.      Patient Active Problem List   Diagnosis     CARDIOVASCULAR SCREENING; LDL GOAL LESS THAN 160     Closed displaced transverse fracture of shaft of left ulna     PMHx: above  PSHx: noncontributory    Objective  /72 (BP Location: Right arm, Patient Position: Sitting, Cuff Size: Adult Regular)   Ht 1.702 m (5' 7\")   Wt 77.1 kg (170 lb)   BMI 26.63 kg/m      GENERAL APPEARANCE: healthy, alert, no distress and cooperative   GAIT: NORMAL  SKIN: no suspicious lesions or rashes  NEURO: Normal strength and tone, mentation intact and speech normal  PSYCH:  mentation appears normal and affect normal/bright  HEENT: no scleral icterus  CV: distal perfusion intact  RESP: nonlabored breathing    Exam:  Left forearm:  Regional pulses normal.  Capillary refill less than 2 seconds.  Normal sensation noted.  No limitations noted on strength testing.  Grossly full motion, minimal pain.    Radiology  Visualized radiographs of left forearm obtained today, and reviewed the images with the patient.  Impression: nonunion fracture ulna.    XR Forearm LT 2 vw    Narrative    LEFT FOREARM TWO VIEWS  11/2/2019 9:38 AM     HISTORY:  Closed displaced transverse fracture of shaft of left ulna  with delayed healing, " subsequent encounter.    COMPARISON: 9/16/2019    FINDINGS: Prominent STT arthrosis again seen. Chondrocalcinosis of the  wrist and elbow, consistent with calcium pyrophosphate deposition  disease.       Impression    IMPRESSION: Again there is a mildly displaced fracture of the ulnar  diaphysis. No convincing solid bony union.       ============    CT FOREARM LEFT WITHOUT CONTRAST September 16, 2019 5:19 PM      HISTORY: Fracture, forearm, imaging follow up. Evaluate ulna fracture  healing. Closed displaced transverse fracture of shaft of left ulna  with routine healing, subsequent encounter. Fracture on 7/31/2019.     TECHNIQUE: Axial images with reconstructions. Radiation dose for this  scan was reduced using automated exposure control, adjustment of the  mA and/or kV according to patient size, or iterative reconstruction  technique.       COMPARISON: Radiograph 9/16/2019.     FINDINGS: Again there is a fracture of the fibular distal diaphysis.  There is mild displacement. There is slight apex radial and apex volar  angulation. There is some callus formation but no evidence of solid  bony union. Chondrocalcinosis of the wrist, consistent with calcium  pyrophosphate deposition disease. Carpal resorptive versus erosive  changes. Prominent degenerative arthrosis at the triscaphe (STT)  joint. Elbow osteoarthritic changes with multiple loose bodies.  Degenerative change at the proximal radioulnar joint.                                                                        IMPRESSION:  1. Ulnar fracture, without solid bony union.  2. Additional findings discussed above.     CAROLINE ESCOBEDO MD    Assessment:  1. 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  Reviewed course.  Icing, OTC medication prn.  Continue with splinting for comfort/support.  Discussed potential to return to see surgeon, if interested in  surgery. He is not particularly interested, given previous time quoted of up to 6 months healing.  Discussed use of bone stimulator. Will connect with "[employer]" representative on Monday, 11/4. From there, anticipate use of bone stimulator and routine radiographic follow up, approximately monthly.  Follow up: will connect with device rep and go from there; likely approximately 1 month for repeat x-ray.  Questions answered. The patient indicates understanding of these issues and agrees with the plan.    Aries Barone, DO, CAQ          Disclaimer: This note consists of symbols derived from keyboarding, dictation and/or voice recognition software. As a result, there may be errors in the script that have gone undetected. Please consider this when interpreting information found in this chart.

## 2019-11-02 ENCOUNTER — OFFICE VISIT (OUTPATIENT)
Dept: ORTHOPEDICS | Facility: CLINIC | Age: 59
End: 2019-11-02
Payer: COMMERCIAL

## 2019-11-02 ENCOUNTER — ANCILLARY PROCEDURE (OUTPATIENT)
Dept: GENERAL RADIOLOGY | Facility: CLINIC | Age: 59
End: 2019-11-02
Attending: PEDIATRICS
Payer: COMMERCIAL

## 2019-11-02 VITALS
WEIGHT: 170 LBS | SYSTOLIC BLOOD PRESSURE: 132 MMHG | HEIGHT: 67 IN | BODY MASS INDEX: 26.68 KG/M2 | DIASTOLIC BLOOD PRESSURE: 72 MMHG

## 2019-11-02 DIAGNOSIS — S52.222G: ICD-10-CM

## 2019-11-02 DIAGNOSIS — S52.222K: Primary | ICD-10-CM

## 2019-11-02 PROCEDURE — 73090 X-RAY EXAM OF FOREARM: CPT | Mod: LT

## 2019-11-02 PROCEDURE — 99213 OFFICE O/P EST LOW 20 MIN: CPT | Performed by: PEDIATRICS

## 2019-11-02 ASSESSMENT — MIFFLIN-ST. JEOR: SCORE: 1539.74

## 2019-11-02 NOTE — LETTER
"    11/2/2019         RE: Jarret Eddy  [address]  [place] [address]        Dear Colleague,    Thank you for referring your patient, Jarret Eddy, to the [facility] [place]. Please see a copy of my visit note below.    Sports Medicine Clinic Visit    PCP: Domingo Gleason    Jarret Eddy is a 59 year old male who is seen in f/u up for Closed displaced transverse fracture of shaft of left ulna with delayed healing, subsequent encounter. 7/31/19  Now 13 weeks out from injury.  Since last visit on 9/16/2019 patient denies swelling, pain or paresthesias, splint removed and repeat radiograph taken prior to seeing the patient.     **  Pain improved compared to previous.  No surgery with Dr Roberts due to timing; unable to do until mid-November.  He also saw Dr Shen through [facility]; advised against surgery.    Chart reviewed; see note from Dr Shen.    Review of Systems  All other systems reviewed and are negative unless noted above.      Patient Active Problem List   Diagnosis     CARDIOVASCULAR SCREENING; LDL GOAL LESS THAN 160     Closed displaced transverse fracture of shaft of left ulna     PMHx: above  PSHx: noncontributory    Objective  /72 (BP Location: Right arm, Patient Position: Sitting, Cuff Size: Adult Regular)   Ht 1.702 m (5' 7\")   Wt 77.1 kg (170 lb)   BMI 26.63 kg/m       GENERAL APPEARANCE: healthy, alert, no distress and cooperative   GAIT: NORMAL  SKIN: no suspicious lesions or rashes  NEURO: Normal strength and tone, mentation intact and speech normal  PSYCH:  mentation appears normal and affect normal/bright  HEENT: no scleral icterus  CV: distal perfusion intact  RESP: nonlabored breathing    Exam:  Left forearm:  Regional pulses normal.  Capillary refill less than 2 seconds.  Normal sensation noted.  No limitations noted on strength testing.  Grossly full motion, minimal pain.    Radiology  Visualized radiographs of left forearm obtained today, " and reviewed the images with the patient.  Impression: nonunion fracture ulna.    XR Forearm LT 2 vw    Narrative    LEFT FOREARM TWO VIEWS  11/2/2019 9:38 AM     HISTORY:  Closed displaced transverse fracture of shaft of left ulna  with delayed healing, subsequent encounter.    COMPARISON: 9/16/2019    FINDINGS: Prominent STT arthrosis again seen. Chondrocalcinosis of the  wrist and elbow, consistent with calcium pyrophosphate deposition  disease.       Impression    IMPRESSION: Again there is a mildly displaced fracture of the ulnar  diaphysis. No convincing solid bony union.       ============    CT FOREARM LEFT WITHOUT CONTRAST September 16, 2019 5:19 PM      HISTORY: Fracture, forearm, imaging follow up. Evaluate ulna fracture  healing. Closed displaced transverse fracture of shaft of left ulna  with routine healing, subsequent encounter. Fracture on 7/31/2019.     TECHNIQUE: Axial images with reconstructions. Radiation dose for this  scan was reduced using automated exposure control, adjustment of the  mA and/or kV according to patient size, or iterative reconstruction  technique.       COMPARISON: Radiograph 9/16/2019.     FINDINGS: Again there is a fracture of the fibular distal diaphysis.  There is mild displacement. There is slight apex radial and apex volar  angulation. There is some callus formation but no evidence of solid  bony union. Chondrocalcinosis of the wrist, consistent with calcium  pyrophosphate deposition disease. Carpal resorptive versus erosive  changes. Prominent degenerative arthrosis at the triscaphe (STT)  joint. Elbow osteoarthritic changes with multiple loose bodies.  Degenerative change at the proximal radioulnar joint.                                                                        IMPRESSION:  1. Ulnar fracture, without solid bony union.  2. Additional findings discussed above.     CAROLINE ESCOBEDO MD    Assessment:  1. Closed displaced transverse fracture of shaft of left ulna  with nonunion, subsequent encounter        Plan:  Discussed the assessment with the patient.  Reviewed course.  Icing, OTC medication prn.  Continue with splinting for comfort/support.  Discussed potential to return to see surgeon, if interested in surgery. He is not particularly interested, given previous time quoted of up to 6 months healing.  Discussed use of bone stimulator. Will connect with [employer] representative on Monday, 11/4. From there, anticipate use of bone stimulator and routine radiographic follow up, approximately monthly.  Follow up: will connect with device rep and go from there; likely approximately 1 month for repeat x-ray.  Questions answered. The patient indicates understanding of these issues and agrees with the plan.    Aries Barone DO, CAQ          Disclaimer: This note consists of symbols derived from keyboarding, dictation and/or voice recognition software. As a result, there may be errors in the script that have gone undetected. Please consider this when interpreting information found in this chart.        Again, thank you for allowing me to participate in the care of your patient.        Sincerely,        Aries Barone DO

## 2019-11-02 NOTE — PATIENT INSTRUCTIONS
Will connect with the representative for [employer] (bone stimulator device)  Splint for support/comfort in the meantime

## 2019-11-04 ENCOUNTER — TELEPHONE (OUTPATIENT)
Dept: ORTHOPEDICS | Facility: CLINIC | Age: 59
End: 2019-11-04

## 2019-11-04 DIAGNOSIS — S52.222K: Primary | ICD-10-CM

## 2019-11-06 NOTE — TELEPHONE ENCOUNTER
Peng from [facility] requesting a copy of the initial notes. States insurance is requiring 2 sets of images that were taken 90 days apart. Would like a call back.

## 2019-11-08 ENCOUNTER — MYC MEDICAL ADVICE (OUTPATIENT)
Dept: ORTHOPEDICS | Facility: CLINIC | Age: 59
End: 2019-11-08

## 2019-11-11 NOTE — TELEPHONE ENCOUNTER
Forms received from patient and completed, placed in physician box for signature  Ann Arboleda MS, ATC

## 2019-12-16 ENCOUNTER — ANCILLARY PROCEDURE (OUTPATIENT)
Dept: GENERAL RADIOLOGY | Facility: CLINIC | Age: 59
End: 2019-12-16
Attending: PEDIATRICS
Payer: COMMERCIAL

## 2019-12-16 ENCOUNTER — OFFICE VISIT (OUTPATIENT)
Dept: ORTHOPEDICS | Facility: CLINIC | Age: 59
End: 2019-12-16
Payer: COMMERCIAL

## 2019-12-16 VITALS
BODY MASS INDEX: 27.94 KG/M2 | HEIGHT: 67 IN | SYSTOLIC BLOOD PRESSURE: 132 MMHG | WEIGHT: 178 LBS | DIASTOLIC BLOOD PRESSURE: 84 MMHG

## 2019-12-16 DIAGNOSIS — S52.222K: ICD-10-CM

## 2019-12-16 DIAGNOSIS — S52.222K: Primary | ICD-10-CM

## 2019-12-16 LAB
ALBUMIN SERPL-MCNC: 4.2 G/DL (ref 3.4–5)
ALP SERPL-CCNC: 44 U/L (ref 40–150)
ALT SERPL W P-5'-P-CCNC: 25 U/L (ref 0–70)
ANION GAP SERPL CALCULATED.3IONS-SCNC: 4 MMOL/L (ref 3–14)
AST SERPL W P-5'-P-CCNC: 18 U/L (ref 0–45)
BASOPHILS # BLD AUTO: 0.1 10E9/L (ref 0–0.2)
BASOPHILS NFR BLD AUTO: 0.6 %
BILIRUB SERPL-MCNC: 0.5 MG/DL (ref 0.2–1.3)
BUN SERPL-MCNC: 13 MG/DL (ref 7–30)
CALCIUM SERPL-MCNC: 9.7 MG/DL (ref 8.5–10.1)
CHLORIDE SERPL-SCNC: 105 MMOL/L (ref 94–109)
CO2 SERPL-SCNC: 30 MMOL/L (ref 20–32)
CREAT SERPL-MCNC: 0.91 MG/DL (ref 0.66–1.25)
CRP SERPL-MCNC: <2.9 MG/L (ref 0–8)
DIFFERENTIAL METHOD BLD: NORMAL
EOSINOPHIL # BLD AUTO: 0.2 10E9/L (ref 0–0.7)
EOSINOPHIL NFR BLD AUTO: 2.3 %
ERYTHROCYTE [DISTWIDTH] IN BLOOD BY AUTOMATED COUNT: 14.1 % (ref 10–15)
ERYTHROCYTE [SEDIMENTATION RATE] IN BLOOD BY WESTERGREN METHOD: 5 MM/H (ref 0–20)
GFR SERPL CREATININE-BSD FRML MDRD: >90 ML/MIN/{1.73_M2}
GLUCOSE SERPL-MCNC: 90 MG/DL (ref 70–99)
HCT VFR BLD AUTO: 43.1 % (ref 40–53)
HGB BLD-MCNC: 14.8 G/DL (ref 13.3–17.7)
LYMPHOCYTES # BLD AUTO: 2.5 10E9/L (ref 0.8–5.3)
LYMPHOCYTES NFR BLD AUTO: 28.1 %
MCH RBC QN AUTO: 30.2 PG (ref 26.5–33)
MCHC RBC AUTO-ENTMCNC: 34.3 G/DL (ref 31.5–36.5)
MCV RBC AUTO: 88 FL (ref 78–100)
MONOCYTES # BLD AUTO: 0.7 10E9/L (ref 0–1.3)
MONOCYTES NFR BLD AUTO: 8.2 %
NEUTROPHILS # BLD AUTO: 5.5 10E9/L (ref 1.6–8.3)
NEUTROPHILS NFR BLD AUTO: 60.8 %
PLATELET # BLD AUTO: 253 10E9/L (ref 150–450)
POTASSIUM SERPL-SCNC: 4.3 MMOL/L (ref 3.4–5.3)
PROT SERPL-MCNC: 7.6 G/DL (ref 6.8–8.8)
RBC # BLD AUTO: 4.9 10E12/L (ref 4.4–5.9)
SODIUM SERPL-SCNC: 139 MMOL/L (ref 133–144)
WBC # BLD AUTO: 9 10E9/L (ref 4–11)

## 2019-12-16 PROCEDURE — 86140 C-REACTIVE PROTEIN: CPT | Performed by: PEDIATRICS

## 2019-12-16 PROCEDURE — 80053 COMPREHEN METABOLIC PANEL: CPT | Performed by: PEDIATRICS

## 2019-12-16 PROCEDURE — 85025 COMPLETE CBC W/AUTO DIFF WBC: CPT | Performed by: PEDIATRICS

## 2019-12-16 PROCEDURE — 99213 OFFICE O/P EST LOW 20 MIN: CPT | Performed by: PEDIATRICS

## 2019-12-16 PROCEDURE — 73090 X-RAY EXAM OF FOREARM: CPT | Mod: LT

## 2019-12-16 PROCEDURE — 36415 COLL VENOUS BLD VENIPUNCTURE: CPT | Performed by: PEDIATRICS

## 2019-12-16 PROCEDURE — 85652 RBC SED RATE AUTOMATED: CPT | Performed by: PEDIATRICS

## 2019-12-16 PROCEDURE — 82306 VITAMIN D 25 HYDROXY: CPT | Performed by: PEDIATRICS

## 2019-12-16 ASSESSMENT — MIFFLIN-ST. JEOR: SCORE: 1576.03

## 2019-12-16 NOTE — LETTER
"    12/16/2019         RE: Jarret Eddy  [address]  [place] [address]        Dear Colleague,    Thank you for referring your patient, Jarret Eddy, to the [facility] [place]. Please see a copy of my visit note below.    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  Since last visit on 11/4/2019 patient denies swelling, pain or paresthesias, splint removed and repeat radiograph taken prior to seeing the patient.    Continues with splint.    Using bone stim for ~1 month. Once per day. 20 mins per time.       Review of Systems  All other systems reviewed and are negative unless noted above.    History reviewed, unchanged. No prior nutritional deficiency.        Current Outpatient Medications   Medication     atenolol (TENORMIN) 25 MG tablet     order for DME     simvastatin (ZOCOR) 20 MG tablet     No current facility-administered medications for this visit.          Objective  /84   Ht 1.702 m (5' 7\")   Wt 80.7 kg (178 lb)   BMI 27.88 kg/m       GENERAL APPEARANCE: healthy, alert and no distress   GAIT: NORMAL  SKIN: no suspicious lesions or rashes  NEURO: Normal strength and tone, mentation intact and speech normal  PSYCH:  mentation appears normal and affect normal/bright  HEENT: no scleral icterus  CV: distal perfusion intact  RESP: nonlabored breathing    Exam:  Left forearm:  Splint fitting well. Distal perfusion and sensation intact. Good ROM digits.    Radiology  Visualized radiographs of left forearm obtained today, and reviewed the images with the patient.  Impression: again note fracture ulna; there appears to be mild sclerosis near fracture margins, but without significant interval change at fracture lucency.      XR Forearm LT 2 vw    " Narrative    XR FOREARM LT 2 VW 12/16/2019 2:13 PM     HISTORY: Closed displaced transverse fracture of shaft of left ulna  with nonunion, subsequent encounter    COMPARISON: 11/2/2019 and multiple prior examinations.    Findings: Oblique fracture of the distal ulnar diaphysis is again  identified. Progressive sclerosis and cortication at the fracture  margin without osseous bridging. No change in alignment. No new  fractures are evident. Stable degenerative changes of the wrist.  Chondrocalcinosis.      Impression    Impression: Ununited distal ulnar diaphyseal fracture    ARIES MORRIS MD         Assessment:  1. 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 Stable. May be some early signs of healing.  Discussed further work up for nonunion, he desires. Will do some screening labs. From there, could consider endocrine work up.  Otherwise, continue with bone stimulator and support. Discussed increasing bone stim to twice daily.  Follow up: call with lab results. Otherwise, ~6 weeks for recheck with repeat xray. He understands that option to return to see surgeon is always there as well.  Questions answered. The patient indicates understanding of these issues and agrees with the plan.    Aries Barone DO, CAQ            Disclaimer: This note consists of symbols derived from keyboarding, dictation and/or voice recognition software. As a result, there may be errors in the script that have gone undetected. Please consider this when interpreting information found in this chart.        Again, thank you for allowing me to participate in the care of your patient.        Sincerely,        Aries Barone DO

## 2019-12-16 NOTE — PROGRESS NOTES
"Sports Medicine Clinic Visit    PCP: Doimngo Gleason    Jarret Eddy is a 59 year old male who is seen in f/u up for Closed displaced transverse fracture of shaft of left ulna with nonunion, subsequent encounter.7/31/19  Now nearly 20 weeks out from injury.  Since last visit on 11/4/2019 patient denies swelling, pain or paresthesias, splint removed and repeat radiograph taken prior to seeing the patient.    Continues with splint.    Using bone stim for ~1 month. Once per day. 20 mins per time.       Review of Systems  All other systems reviewed and are negative unless noted above.    History reviewed, unchanged. No prior nutritional deficiency.        Current Outpatient Medications   Medication     atenolol (TENORMIN) 25 MG tablet     order for DME     simvastatin (ZOCOR) 20 MG tablet     No current facility-administered medications for this visit.          Objective  /84   Ht 1.702 m (5' 7\")   Wt 80.7 kg (178 lb)   BMI 27.88 kg/m      GENERAL APPEARANCE: healthy, alert and no distress   GAIT: NORMAL  SKIN: no suspicious lesions or rashes  NEURO: Normal strength and tone, mentation intact and speech normal  PSYCH:  mentation appears normal and affect normal/bright  HEENT: no scleral icterus  CV: distal perfusion intact  RESP: nonlabored breathing    Exam:  Left forearm:  Splint fitting well. Distal perfusion and sensation intact. Good ROM digits.    Radiology  Visualized radiographs of left forearm obtained today, and reviewed the images with the patient.  Impression: again note fracture ulna; there appears to be mild sclerosis near fracture margins, but without significant interval change at fracture lucency.      XR Forearm LT 2 vw    Narrative    XR FOREARM LT 2 VW 12/16/2019 2:13 PM     HISTORY: Closed displaced transverse fracture of shaft of left ulna  with nonunion, subsequent encounter    COMPARISON: 11/2/2019 and multiple prior examinations.    Findings: Oblique fracture of the distal ulnar " diaphysis is again  identified. Progressive sclerosis and cortication at the fracture  margin without osseous bridging. No change in alignment. No new  fractures are evident. Stable degenerative changes of the wrist.  Chondrocalcinosis.      Impression    Impression: Ununited distal ulnar diaphyseal fracture    ARIES MORRIS MD         Assessment:  1. 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 Stable. May be some early signs of healing.  Discussed further work up for nonunion, he desires. Will do some screening labs. From there, could consider endocrine work up.  Otherwise, continue with bone stimulator and support. Discussed increasing bone stim to twice daily.  Follow up: call with lab results. Otherwise, ~6 weeks for recheck with repeat xray. He understands that option to return to see surgeon is always there as well.  Questions answered. The patient indicates understanding of these issues and agrees with the plan.    Aries Barone, DO, CAQ            Disclaimer: This note consists of symbols derived from keyboarding, dictation and/or voice recognition software. As a result, there may be errors in the script that have gone undetected. Please consider this when interpreting information found in this chart.

## 2019-12-17 LAB — DEPRECATED CALCIDIOL+CALCIFEROL SERPL-MC: 66 UG/L (ref 20–75)

## 2019-12-18 ENCOUNTER — MYC MEDICAL ADVICE (OUTPATIENT)
Dept: ORTHOPEDICS | Facility: CLINIC | Age: 59
End: 2019-12-18

## 2019-12-18 ENCOUNTER — TELEPHONE (OUTPATIENT)
Dept: ORTHOPEDICS | Facility: CLINIC | Age: 59
End: 2019-12-18

## 2019-12-18 NOTE — TELEPHONE ENCOUNTER
Vitamin D Deficiency   Order: [id number]   Status:  Final result   Visible to patient:  No (Not Released) Dx:  Closed displaced transverse fracture ...    Ref Range & Units 2d ago   Vitamin D Deficiency screening 20 - 75 ug/L 66    Comment: Season, race, dietary intake, and treatment affect the concentration of   25-hydroxy-Vitamin D. Values may decrease during winter months and increase   during summer months. Values 20-29 ug/L may indicate Vitamin D insufficiency   and values <20 ug/L may indicate Vitamin D deficiency.   Vitamin D determination is routinely performed by an immunoassay specific for   25 hydroxyvitamin D3.  If an individual is on vitamin D2 (ergocalciferol)   supplementation, please specify 25 OH vitamin D2 and D3 level determination by    LCMSMS test [id number].    Resulting Agency  [facility]         Specimen Collected: 12/16/19  3:02 PM Last Resulted: 12/17/19 12:50 PM         Lab Flowsheet       Order Details       View Encounter       Lab and Collection Details       Routing       Result History               Comprehensive metabolic panel   Order: [id number]   Status:  Final result   Visible to patient:  Yes (MyChart) Dx:  Closed displaced transverse fracture ...    Ref Range & Units 2d ago   Sodium 133 - 144 mmol/L 139    Potassium 3.4 - 5.3 mmol/L 4.3    Chloride 94 - 109 mmol/L 105    Carbon Dioxide 20 - 32 mmol/L 30    Anion Gap 3 - 14 mmol/L 4    Glucose 70 - 99 mg/dL 90    Urea Nitrogen 7 - 30 mg/dL 13    Creatinine 0.66 - 1.25 mg/dL 0.91    GFR Estimate >60 mL/min/ >90    Comment: Non  GFR Calc   Starting 12/18/2018, serum creatinine based estimated GFR (eGFR) will be   calculated using the Chronic Kidney Disease Epidemiology Collaboration   (CKD-EPI) equation.    GFR Estimate If Black >60 mL/min/ >90    Comment:  GFR Calc   Starting 12/18/2018, serum creatinine based estimated GFR (eGFR) will be   calculated using the Chronic  Kidney Disease Epidemiology Collaboration   (CKD-EPI) equation.    Calcium 8.5 - 10.1 mg/dL 9.7    Bilirubin Total 0.2 - 1.3 mg/dL 0.5    Albumin 3.4 - 5.0 g/dL 4.2    Protein Total 6.8 - 8.8 g/dL 7.6    Alkaline Phosphatase 40 - 150 U/L 44    ALT 0 - 70 U/L 25    AST 0 - 45 U/L 18    Resulting Agency  [facility]         Specimen Collected: 12/16/19  3:02 PM Last Resulted: 12/16/19  7:24 PM         Lab Flowsheet       Order Details       View Encounter       Lab and Collection Details       Routing       Result History               CRP inflammation   Order: [id number]   Status:  Final result   Visible to patient:  Yes (MyChart) Dx:  Closed displaced transverse fracture ...    Ref Range & Units 2d ago   CRP Inflammation 0.0 - 8.0 mg/L <2.9    Resulting Agency  [facility]         Specimen Collected: 12/16/19  3:02 PM Last Resulted: 12/16/19  7:11 PM         Lab Flowsheet       Order Details       View Encounter       Lab and Collection Details       Routing       Result History               CBC with platelets differential   Order: [id number]   Status:  Final result   Visible to patient:  Yes (MyChart) Dx:  Closed displaced transverse fracture ...    Ref Range & Units 2d ago   WBC 4.0 - 11.0 10e9/L 9.0    RBC Count 4.4 - 5.9 10e12/L 4.90    Hemoglobin 13.3 - 17.7 g/dL 14.8    Hematocrit 40.0 - 53.0 % 43.1    MCV 78 - 100 fl 88    MCH 26.5 - 33.0 pg 30.2    MCHC 31.5 - 36.5 g/dL 34.3    RDW 10.0 - 15.0 % 14.1    Platelet Count 150 - 450 10e9/L 253    % Neutrophils % 60.8    % Lymphocytes % 28.1    % Monocytes % 8.2    % Eosinophils % 2.3    % Basophils % 0.6    Absolute Neutrophil 1.6 - 8.3 10e9/L 5.5    Absolute Lymphocytes 0.8 - 5.3 10e9/L 2.5    Absolute Monocytes 0.0 - 1.3 10e9/L 0.7    Absolute Eosinophils 0.0 - 0.7 10e9/L 0.2    Absolute Basophils 0.0 - 0.2 10e9/L 0.1    Diff Method  Automated Method    Resulting Agency  [facility]         Specimen Collected: 12/16/19  3:02 PM Last Resulted: 12/16/19  3:49 PM         Lab Flowsheet        Order Details       View Encounter       Lab and Collection Details       Routing       Result History               Erythrocyte sedimentation rate auto   Order: [id number]   Status:  Final result   Visible to patient:  Yes (MyChart) Dx:  Closed displaced transverse fracture ...    Ref Range & Units 2d ago   Sed Rate 0 - 20 mm/h 5    Resulting Agency  [facility]         Specimen Collected: 12/16/19  3:02 PM Last Resulted: 12/16/19  3:48 PM

## 2019-12-18 NOTE — TELEPHONE ENCOUNTER
Called and spoke with patient.  He would like to decline the endocrine referral.    Ann Arboleda MS ATC

## 2019-12-18 NOTE — TELEPHONE ENCOUNTER
LVM for patient to call back to discuss lab results.    Placed note for patient to read with results via [employer].      Ann Arboleda MS ATC

## 2019-12-18 NOTE — TELEPHONE ENCOUNTER
Normal. No apparent inflammatory condition or vitamin D deficiency.  Could consider endocrine referral for further work up; if he wants to proceed, would offer referral. Otherwise, continue with current treatment as noted, f/u as previously discussed.  Thanks.  Aries Barone DO, CAQ

## 2020-01-10 ENCOUNTER — ANCILLARY PROCEDURE (OUTPATIENT)
Dept: GENERAL RADIOLOGY | Facility: CLINIC | Age: 60
End: 2020-01-10
Attending: PEDIATRICS
Payer: COMMERCIAL

## 2020-01-10 ENCOUNTER — OFFICE VISIT (OUTPATIENT)
Dept: ORTHOPEDICS | Facility: CLINIC | Age: 60
End: 2020-01-10
Payer: COMMERCIAL

## 2020-01-10 VITALS
BODY MASS INDEX: 27.94 KG/M2 | DIASTOLIC BLOOD PRESSURE: 78 MMHG | HEIGHT: 67 IN | SYSTOLIC BLOOD PRESSURE: 130 MMHG | WEIGHT: 178 LBS

## 2020-01-10 DIAGNOSIS — S52.222K: ICD-10-CM

## 2020-01-10 DIAGNOSIS — S52.222K: Primary | ICD-10-CM

## 2020-01-10 PROCEDURE — 99213 OFFICE O/P EST LOW 20 MIN: CPT | Performed by: PEDIATRICS

## 2020-01-10 PROCEDURE — 73090 X-RAY EXAM OF FOREARM: CPT | Mod: LT

## 2020-01-10 ASSESSMENT — MIFFLIN-ST. JEOR: SCORE: 1576.03

## 2020-01-10 NOTE — PATIENT INSTRUCTIONS
X-rays appear to show some progressive change, but slow  Letter provided regarding condition  Plan follow up ~4-6 weeks  Alternatively, may follow up with one of the orthopedic surgeons if wanting to change course and discuss surgery again

## 2020-01-10 NOTE — LETTER
[facility] [place]  [address]  [place] [address]  Phone: [phone]  Fax: [phone]      January 10, 2020      RE: Jarret Eddy  [address] [facility] [address]        To whom it may concern:    Jarret Eddy has under my professional care for his left forearm fracture.    Due to lack of healing at the fracture site, he has been using a bone stimulator, with routine support/immobilization of the fracture, and has had to stop work to allow for healing.          Sincerely,            Aries WHITESIDE

## 2020-01-10 NOTE — LETTER
"    1/10/2020         RE: Jarret Eddy  [address]  [facility] [address]        Dear Colleague,    Thank you for referring your patient, Jarret Eddy, to the [facility] [place]. Please see a copy of my visit note below.    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 Since last visit on 12/18/2019 patient has continued with the use of the bone stimulator.  He does feel that there has been improvement in his pain.  .     DOI 7/31/19: 23 weeks      **  Stopped working on 11/13/19. However, has not been getting paid for disability.  Using bone stimulator for past 2 months. Twice daily.      Review of Systems  All other systems reviewed and are negative unless noted above.    History not significantly changed.      Objective  /78   Ht 1.702 m (5' 7\")   Wt 80.7 kg (178 lb)   BMI 27.88 kg/m       GENERAL APPEARANCE: healthy, alert and no distress   GAIT: NORMAL  SKIN: no suspicious lesions or rashes  NEURO: Normal strength and tone, mentation intact and speech normal  PSYCH:  mentation appears normal and affect normal/bright  HEENT: no scleral icterus  CV: distal perfusion intact  RESP: nonlabored breathing      Exam  Left wrist splint is fitting well  Full motion of exposed digits left  neurovascularly intact left UE.      Radiology  Visualized radiographs of left forearm obtained today, and reviewed the images with the patient.  Impression: I think there is mild interval healing progression compared to most recent images.    XR Forearm LT 2 vw    Narrative    FOREARM LEFT TWO VIEW 1/10/2020 10:28 AM     HISTORY: Closed displaced transverse fracture of shaft of left ulna  with nonunion, subsequent encounter.    COMPARISON: 12/16/2019.      Impression    IMPRESSION: Incompletely unified oblique fracture " distal ulnar  diaphysis without change in alignment. Again seen is sclerosis about  the fracture line and there is nonunion of the fracture periphery. It  is difficult to assess the central aspect of the fracture due to  overlapping structures. If bony union needs to be assessed, a  high-resolution CT exam could be obtained. There may be minimal  increased healing callus since the prior exam. No new bony  abnormalities.    FLAVIO AYALA MD         Assessment:  1. 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  Reviewed course. Some clinical improvement, also some radiographic improvement.  Discussed options of continuing with bone stimulator and support of the fracture, vs return to see ortho surgeon. He may weigh options yet, but for now plan to continue with splint and bone stimulator.  Provided a letter regarding condition, as he has been on disability for some time, but not getting paid. Advised he may contact clinic if needing additional documentation.  Follow up: 4-6 weeks with repeat x-ray. Otherwise, if wanting to see ortho surgeon again, may do so.  Questions answered. The patient indicates understanding of these issues and agrees with the plan.    Aries Barone DO, CAQ        Patient Instructions   X-rays appear to show some progressive change, but slow  Letter provided regarding condition  Plan follow up ~4-6 weeks  Alternatively, may follow up with one of the orthopedic surgeons if wanting to change course and discuss surgery again        Disclaimer: This note consists of symbols derived from keyboarding, dictation and/or voice recognition software. As a result, there may be errors in the script that have gone undetected. Please consider this when interpreting information found in this chart.          Again, thank you for allowing me to participate in the care of your patient.         Sincerely,        Aries Barone, DO

## 2020-01-10 NOTE — PROGRESS NOTES
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 Since last visit on 12/18/2019 patient has continued with the use of the bone stimulator.  He does feel that there has been improvement in his pain.  .     DOI 7/31/19: 23 weeks      **  Stopped working on 11/13/19. However, has not been getting paid for disability.  Using bone stimulator for past 2 months. Twice daily.      Review of Systems  All other systems reviewed and are negative unless noted above.    History not significantly changed.      Objective  /78   Ht 1.702 m (5' 7\")   Wt 80.7 kg (178 lb)   BMI 27.88 kg/m      GENERAL APPEARANCE: healthy, alert and no distress   GAIT: NORMAL  SKIN: no suspicious lesions or rashes  NEURO: Normal strength and tone, mentation intact and speech normal  PSYCH:  mentation appears normal and affect normal/bright  HEENT: no scleral icterus  CV: distal perfusion intact  RESP: nonlabored breathing      Exam  Left wrist splint is fitting well  Full motion of exposed digits left  neurovascularly intact left UE.      Radiology  Visualized radiographs of left forearm obtained today, and reviewed the images with the patient.  Impression: I think there is mild interval healing progression compared to most recent images.    XR Forearm LT 2 vw    Narrative    FOREARM LEFT TWO VIEW 1/10/2020 10:28 AM     HISTORY: Closed displaced transverse fracture of shaft of left ulna  with nonunion, subsequent encounter.    COMPARISON: 12/16/2019.      Impression    IMPRESSION: Incompletely unified oblique fracture distal ulnar  diaphysis without change in alignment. Again seen is sclerosis about  the fracture line and there is nonunion of the fracture periphery. It  is difficult to assess the central aspect of the fracture due to  overlapping structures. If bony union needs " to be assessed, a  high-resolution CT exam could be obtained. There may be minimal  increased healing callus since the prior exam. No new bony  abnormalities.    FLAVIO AYALA MD         Assessment:  1. 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  Reviewed course. Some clinical improvement, also some radiographic improvement.  Discussed options of continuing with bone stimulator and support of the fracture, vs return to see ortho surgeon. He may weigh options yet, but for now plan to continue with splint and bone stimulator.  Provided a letter regarding condition, as he has been on disability for some time, but not getting paid. Advised he may contact clinic if needing additional documentation.  Follow up: 4-6 weeks with repeat x-ray. Otherwise, if wanting to see ortho surgeon again, may do so.  Questions answered. The patient indicates understanding of these issues and agrees with the plan.    Aries Barone, DO, CAQ        Patient Instructions   X-rays appear to show some progressive change, but slow  Letter provided regarding condition  Plan follow up ~4-6 weeks  Alternatively, may follow up with one of the orthopedic surgeons if wanting to change course and discuss surgery again        Disclaimer: This note consists of symbols derived from keyboarding, dictation and/or voice recognition software. As a result, there may be errors in the script that have gone undetected. Please consider this when interpreting information found in this chart.

## 2020-02-16 ENCOUNTER — HEALTH MAINTENANCE LETTER (OUTPATIENT)
Age: 60
End: 2020-02-16

## 2020-02-19 ENCOUNTER — ANCILLARY PROCEDURE (OUTPATIENT)
Dept: GENERAL RADIOLOGY | Facility: CLINIC | Age: 60
End: 2020-02-19
Attending: PEDIATRICS
Payer: COMMERCIAL

## 2020-02-19 ENCOUNTER — OFFICE VISIT (OUTPATIENT)
Dept: ORTHOPEDICS | Facility: CLINIC | Age: 60
End: 2020-02-19
Payer: COMMERCIAL

## 2020-02-19 ENCOUNTER — TRANSFERRED RECORDS (OUTPATIENT)
Dept: ORTHOPEDICS | Facility: CLINIC | Age: 60
End: 2020-02-19

## 2020-02-19 VITALS
WEIGHT: 183 LBS | BODY MASS INDEX: 28.72 KG/M2 | DIASTOLIC BLOOD PRESSURE: 84 MMHG | SYSTOLIC BLOOD PRESSURE: 132 MMHG | HEIGHT: 67 IN

## 2020-02-19 DIAGNOSIS — S52.222G: Primary | ICD-10-CM

## 2020-02-19 DIAGNOSIS — S52.222K: ICD-10-CM

## 2020-02-19 PROCEDURE — 73090 X-RAY EXAM OF FOREARM: CPT | Mod: LT

## 2020-02-19 PROCEDURE — 99213 OFFICE O/P EST LOW 20 MIN: CPT | Performed by: PEDIATRICS

## 2020-02-19 ASSESSMENT — MIFFLIN-ST. JEOR: SCORE: 1598.71

## 2020-02-19 NOTE — LETTER
"    2/19/2020         RE: Jarret Eddy  [address]  [place] [address]        Dear Colleague,    Thank you for referring your patient, Jarret Eddy, to the [facility] [place]. Please see a copy of my visit note below.    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 Since last visit on 1/10/2020 patient has had some improvement in his forearm.  Has continued to use the bone stimulator 2 x 20 min daily.     Has been using his splint at night, out and while at work.  Has tried to discontinue the use while at rest.      DOI 7/31/19; 6.5 months   **  No interval injury.  Has been working, but limited.  Using the splint fairly consistently, though has reduced use when at rest.  Feels like he has started to get some more significant improvement in the last couple weeks.    Review of Systems  All other systems reviewed and are negative unless noted above.    Patient Active Problem List   Diagnosis     CARDIOVASCULAR SCREENING; LDL GOAL LESS THAN 160     Closed displaced transverse fracture of shaft of left ulna     PMHx: Above  PSHx: Noncontributory      Objective  /84   Ht 1.702 m (5' 7\")   Wt 83 kg (183 lb)   BMI 28.66 kg/m       GENERAL APPEARANCE: healthy, alert and no distress   GAIT: NORMAL  SKIN: no suspicious lesions or rashes  NEURO: Normal strength and tone, mentation intact and speech normal  PSYCH:  mentation appears normal and affect normal/bright  HEENT: no scleral icterus  CV: distal perfusion intact  RESP: nonlabored breathing      Exam  Left forearm/wrist:  No swelling or ecchymosis.  Forearm rotation, wrist motion without any significant change in symptoms.  Some palpable firm prominence at fracture site, consistent with bony healing noted on the x-ray.  This area is " nontender.      Radiology  Visualized radiographs of left forearm obtained today, and reviewed the images with the patient.  Impression: Mild interval healing of the previously noted ulnar fracture, compared to prior images.    XR Forearm LT 2 vw    Narrative    FOREARM LEFT TWO VIEWS  2/19/2020 1:31 PM     HISTORY: Closed displaced transverse fracture of shaft of left ulna  with nonunion, subsequent encounter.      Impression    IMPRESSION: Left ulnar distal diaphyseal fracture. Radiolucency  persists at the fracture line. Alignment is unchanged compared to  1/10/2020.    GIANFRANCO ORELLANA MD         Assessment:  1. Closed displaced transverse fracture of shaft of left ulna with delayed healing, subsequent encounter        Plan:  Discussed the assessment with the patient.  Clinically some improvement, and over time there does appear to be some positive change on the x-rays.  Reviewed options again, with continued splinting for support if any symptoms, versus returning to see orthopedic surgeon.  He continues to desire to avoid surgery.  We will continue with splinting for now, may remove at rest as he is doing.  Continue with bone stimulator as well.  Follow up: 6 to 8 weeks, repeat x-ray at that time, sooner if needed.  If he wants to have a repeat x-ray outside of a clinic visit he can contact the clinic for an x-ray order, with study to be done here or at local primary care.  Questions answered. The patient indicates understanding of these issues and agrees with the plan.    Aries Barone DO, CAANGEL      Patient Instructions   Updated letter for work.  Discussed repeat x-ray in 6-8 weeks; happy to see you back in clinic for this if desired. Or, if preferring to do just an x-ray outside of a clinic visit, contact the clinic (MyChart or phone call) and we can place an order for the x-ray and then contact you with results.          Disclaimer: This note consists of symbols derived from keyboarding, dictation and/or  voice recognition software. As a result, there may be errors in the script that have gone undetected. Please consider this when interpreting information found in this chart.        Again, thank you for allowing me to participate in the care of your patient.        Sincerely,        Aries Barone, DO

## 2020-02-19 NOTE — LETTER
[facility] [place]  [address]  [place] [address]  Phone: [phone]  Fax: [phone]      February 19, 2020      RE: Jarret Eddy  [address] [address]        To whom it may concern:    Jarret Eddy was seen in recheck in clinic today. The employee is ABLE to return to work next scheduled work date.    When the patient returns to work, advise the following:  For the next 2 weeks, advise no use of left upper extremity.    In 2 weeks, may start the following:  May use left upper extremity for light duty tasks, such as paperwork, use of computer/keyboard.  No independent lift/carry/push/pull with left upper extremity. May use left UE as helper for right if pain free on the left.  Keep work close to body; best work height is mid-chest to mid-thigh.        Sincerely,            Aries ROSS.

## 2020-02-19 NOTE — PROGRESS NOTES
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 Since last visit on 1/10/2020 patient has had some improvement in his forearm.  Has continued to use the bone stimulator 2 x 20 min daily.     Has been using his splint at night, out and while at work.  Has tried to discontinue the use while at rest.      DOI 7/31/19; 6.5 months   **  No interval injury.  Has been working, but limited.  Using the splint fairly consistently, though has reduced use when at rest.  Feels like he has started to get some more significant improvement in the last couple weeks.    Review of Systems  All other systems reviewed and are negative unless noted above.    Patient Active Problem List   Diagnosis     CARDIOVASCULAR SCREENING; LDL GOAL LESS THAN 160     Closed displaced transverse fracture of shaft of left ulna     PMHx: Above  PSHx: Noncontributory      Objective  /84   Ht 1.702 m (5' 7\")   Wt 83 kg (183 lb)   BMI 28.66 kg/m      GENERAL APPEARANCE: healthy, alert and no distress   GAIT: NORMAL  SKIN: no suspicious lesions or rashes  NEURO: Normal strength and tone, mentation intact and speech normal  PSYCH:  mentation appears normal and affect normal/bright  HEENT: no scleral icterus  CV: distal perfusion intact  RESP: nonlabored breathing      Exam  Left forearm/wrist:  No swelling or ecchymosis.  Forearm rotation, wrist motion without any significant change in symptoms.  Some palpable firm prominence at fracture site, consistent with bony healing noted on the x-ray.  This area is nontender.      Radiology  Visualized radiographs of left forearm obtained today, and reviewed the images with the patient.  Impression: Mild interval healing of the previously noted ulnar fracture, compared to prior images.    XR Forearm LT 2 vw    Narrative    FOREARM LEFT TWO " VIEWS  2/19/2020 1:31 PM     HISTORY: Closed displaced transverse fracture of shaft of left ulna  with nonunion, subsequent encounter.      Impression    IMPRESSION: Left ulnar distal diaphyseal fracture. Radiolucency  persists at the fracture line. Alignment is unchanged compared to  1/10/2020.    GIANFRANCO ORELLANA MD         Assessment:  1. Closed displaced transverse fracture of shaft of left ulna with delayed healing, subsequent encounter        Plan:  Discussed the assessment with the patient.  Clinically some improvement, and over time there does appear to be some positive change on the x-rays.  Reviewed options again, with continued splinting for support if any symptoms, versus returning to see orthopedic surgeon.  He continues to desire to avoid surgery.  We will continue with splinting for now, may remove at rest as he is doing.  Continue with bone stimulator as well.  Follow up: 6 to 8 weeks, repeat x-ray at that time, sooner if needed.  If he wants to have a repeat x-ray outside of a clinic visit he can contact the clinic for an x-ray order, with study to be done here or at local primary care.  Questions answered. The patient indicates understanding of these issues and agrees with the plan.    Aries Barone, , CAQ      Patient Instructions   Updated letter for work.  Discussed repeat x-ray in 6-8 weeks; happy to see you back in clinic for this if desired. Or, if preferring to do just an x-ray outside of a clinic visit, contact the clinic (MyChart or phone call) and we can place an order for the x-ray and then contact you with results.          Disclaimer: This note consists of symbols derived from keyboarding, dictation and/or voice recognition software. As a result, there may be errors in the script that have gone undetected. Please consider this when interpreting information found in this chart.

## 2020-05-08 ENCOUNTER — MYC MEDICAL ADVICE (OUTPATIENT)
Dept: ORTHOPEDICS | Facility: CLINIC | Age: 60
End: 2020-05-08

## 2020-05-08 DIAGNOSIS — S52.222G: Primary | ICD-10-CM

## 2020-05-09 ENCOUNTER — ANCILLARY PROCEDURE (OUTPATIENT)
Dept: GENERAL RADIOLOGY | Facility: CLINIC | Age: 60
End: 2020-05-09
Attending: PEDIATRICS
Payer: COMMERCIAL

## 2020-05-09 ENCOUNTER — APPOINTMENT (OUTPATIENT)
Dept: GENERAL RADIOLOGY | Facility: CLINIC | Age: 60
End: 2020-05-09
Payer: COMMERCIAL

## 2020-05-09 DIAGNOSIS — S52.222G: ICD-10-CM

## 2020-05-09 PROCEDURE — 73090 X-RAY EXAM OF FOREARM: CPT | Mod: LT

## 2020-05-09 NOTE — TELEPHONE ENCOUNTER
Order was submitted for a left forearm xray per Dr Barone's last office visit note. Contacted patient informing him to schedule xray. Patient was scheduled for Xray only visit at [facility] on 5/9/11 at 1:15.     Trey Jacobs, AIME, LAT

## 2020-05-11 NOTE — TELEPHONE ENCOUNTER
Any update on his status? Still using the bone stimulator?  I think there has been mild change at the fracture site, with increased sclerosis at the fracture site, and some decrease in fracture lucency at the margins of the fracture. This generally represents healing. However, the fracture line is still visible.  For now, would plan to continue with bone stimulator, and potential f/u x-ray in 4-6 weeks. However, await update from pt on status first and will go from there.  Thanks.  Aries Barone DO, CAQ    Recent Results (from the past 744 hour(s))   XR Forearm LT 2 vw    Narrative    EXAM: LEFT FOREARM TWO VIEWS    DATE/TIME: 5/9/2020 1:18 PM     INDICATION: Left ulnar fracture, interval follow-up.     COMPARISON: 2/19/2020.      Impression    IMPRESSION: Nondisplaced left ulna distal diaphysis fracture. The  fracture line remains visible, without evidence of bridging bone.  Slightly increased sclerosis and hypertrophic changes at the fracture  margins. No change in fracture alignment or orientation. Mild thumb  CMC degenerative arthrosis. Remainder unremarkable.    STEWART GARZON MD

## 2020-05-22 ENCOUNTER — MYC MEDICAL ADVICE (OUTPATIENT)
Dept: ORTHOPEDICS | Facility: CLINIC | Age: 60
End: 2020-05-22

## 2020-05-22 DIAGNOSIS — S52.222K: Primary | ICD-10-CM

## 2020-05-22 NOTE — TELEPHONE ENCOUNTER
Would go ahead and continue, would not hurt to have it recharged.  Signed.  Thanks.  Aries Barone DO, CAQ

## 2020-06-18 ENCOUNTER — MYC MEDICAL ADVICE (OUTPATIENT)
Dept: ORTHOPEDICS | Facility: CLINIC | Age: 60
End: 2020-06-18

## 2020-06-18 NOTE — TELEPHONE ENCOUNTER
I would advise another x-ray. I'm ok with placing an order, and he can get the forearm x-ray without a visit if desired. He can do the x-ray at his convenience, and we can contact him with results. Would suggest x-ray in the next ~2 weeks.  If the updated x-ray shows interval healing, then we should be able to reduce the frequency of x-rays.  Keep using the stimulator in the meantime.  Thanks.  Aries Barone DO, CAQ

## 2020-06-19 ENCOUNTER — MYC MEDICAL ADVICE (OUTPATIENT)
Dept: ORTHOPEDICS | Facility: CLINIC | Age: 60
End: 2020-06-19

## 2020-06-19 DIAGNOSIS — S52.222K: Primary | ICD-10-CM

## 2020-06-23 ENCOUNTER — ANCILLARY PROCEDURE (OUTPATIENT)
Dept: GENERAL RADIOLOGY | Facility: CLINIC | Age: 60
End: 2020-06-23
Attending: PEDIATRICS
Payer: COMMERCIAL

## 2020-06-23 DIAGNOSIS — S52.222K: ICD-10-CM

## 2020-06-23 PROCEDURE — 73090 X-RAY EXAM OF FOREARM: CPT | Mod: LT

## 2020-06-24 ENCOUNTER — TELEPHONE (OUTPATIENT)
Dept: ORTHOPEDICS | Facility: CLINIC | Age: 60
End: 2020-06-24

## 2020-06-24 NOTE — TELEPHONE ENCOUNTER
Please advise of x-ray results. Report does not indicate much change. However, I think there is some progressive healing, though the fracture line remains visible.    Current status?    I think he might as well go ahead and continue with the bone stimulator for another 1-2 months, as he has it. I would like to see more fracture healing before discontinuing it, though in my opinion there definitely is progression of healing with time (in comparing to x-rays from January 2020 and prior to that).  Would suggest clinic follow up for further discussion and review in 1-2 months, but if he is completely pain free, and has full function with continued use of bone stimulator at that time, he may contact clinic with update (phone or MyChart ok to start) and we can go from there.  Thanks.  Aries Barone DO, SONG

## 2020-06-24 NOTE — TELEPHONE ENCOUNTER
Results for orders placed or performed in visit on 06/23/20   XR Forearm LT 2 vw    Narrative    XR FOREARM LT 2 VW 6/23/2020 2:47 PM     HISTORY: Closed displaced transverse fracture of shaft of left ulna  with nonunion, subsequent encounter      Impression    IMPRESSION: Distal ulnar diaphyseal fracture line remains visible. The  alignment and appearance are unchanged from 5/9/2020.    GIANFRANCO ORELLANA MD

## 2020-11-22 ENCOUNTER — HEALTH MAINTENANCE LETTER (OUTPATIENT)
Age: 60
End: 2020-11-22

## 2021-03-04 ENCOUNTER — TRANSFERRED RECORDS (OUTPATIENT)
Dept: HEALTH INFORMATION MANAGEMENT | Facility: CLINIC | Age: 61
End: 2021-03-04

## 2021-03-25 ENCOUNTER — TRANSFERRED RECORDS (OUTPATIENT)
Dept: HEALTH INFORMATION MANAGEMENT | Facility: CLINIC | Age: 61
End: 2021-03-25

## 2021-04-04 ENCOUNTER — HEALTH MAINTENANCE LETTER (OUTPATIENT)
Age: 61
End: 2021-04-04

## 2021-09-18 ENCOUNTER — HEALTH MAINTENANCE LETTER (OUTPATIENT)
Age: 61
End: 2021-09-18

## 2022-02-10 ENCOUNTER — TRANSFERRED RECORDS (OUTPATIENT)
Dept: HEALTH INFORMATION MANAGEMENT | Facility: CLINIC | Age: 62
End: 2022-02-10
Payer: COMMERCIAL

## 2022-04-30 ENCOUNTER — HEALTH MAINTENANCE LETTER (OUTPATIENT)
Age: 62
End: 2022-04-30

## 2022-07-11 ENCOUNTER — TRANSFERRED RECORDS (OUTPATIENT)
Dept: ORTHOPEDICS | Facility: CLINIC | Age: 62
End: 2022-07-11

## 2022-09-16 NOTE — PATIENT INSTRUCTIONS
Patient signed medical records request form in office to receive records from [address] [place] [address]. Request form faxed to [phone] on 9/16/22. Request form also sent to scanning to be scanned to patient chart. Chacha Palacios
Please remember to call and schedule a follow up appointment if one was recommended at your earliest convenience.  Orthopedics CLINIC HOURS TELEPHONE NUMBER   Dr. Daisha Hagan  Certified Medical Assistant   Monday & Wednesday   8am - 5pm  Thursday 1pm - 5pm  Friday 8am -11:30am Specialty schedulers:   [phone] to make an appointment with any Specialty Provider.   Main Clinic:   [phone] to make an appointment with your primary provider   Urgent Care locations:    [facility] Monday-Friday Closed  Saturday-Sunday 9am-5pm      Monday-Friday 12pm - 8pm  Saturday-Sunday 9am-5pm [phone]     If SURGERY has been recommended, please call our Specialty Schedulers at [phone] to schedule your procedure.    If you need a medication refill, please contact your pharmacy. Please allow 3 business days for your refill to be completed.    If an MRI or CT scan has been recommended, please call [facility] Schedulers at [phone] to schedule your appointment.  Use [employer] (secure e-mail communication and access to your chart) to send a message or to make an appointment. Please ask how you can sign up for [email or web address].  Your care team's suggested websites for health information:   [email or web address] : Up to date and easily searchable information on multiple topics.   [email or web address] : [place] dept of heat, public health issues in [place], N1N1      
English

## 2022-11-20 ENCOUNTER — HEALTH MAINTENANCE LETTER (OUTPATIENT)
Age: 62
End: 2022-11-20

## 2023-06-02 ENCOUNTER — HEALTH MAINTENANCE LETTER (OUTPATIENT)
Age: 63
End: 2023-06-02

## 2023-07-30 ENCOUNTER — OFFICE VISIT (OUTPATIENT)
Dept: URGENT CARE | Facility: URGENT CARE | Age: 63
End: 2023-07-30
Payer: COMMERCIAL

## 2023-07-30 VITALS
BODY MASS INDEX: 26.66 KG/M2 | TEMPERATURE: 97.8 F | OXYGEN SATURATION: 97 % | SYSTOLIC BLOOD PRESSURE: 136 MMHG | DIASTOLIC BLOOD PRESSURE: 78 MMHG | WEIGHT: 170.25 LBS | RESPIRATION RATE: 17 BRPM | HEART RATE: 48 BPM

## 2023-07-30 DIAGNOSIS — L72.9 INFECTED CYST OF SKIN: Primary | ICD-10-CM

## 2023-07-30 DIAGNOSIS — L08.9 INFECTED CYST OF SKIN: Primary | ICD-10-CM

## 2023-07-30 PROCEDURE — 99203 OFFICE O/P NEW LOW 30 MIN: CPT | Performed by: PHYSICIAN ASSISTANT

## 2023-07-30 RX ORDER — SACCHAROMYCES BOULARDII 250 MG
250 CAPSULE ORAL 2 TIMES DAILY
Qty: 28 CAPSULE | Refills: 0 | Status: SHIPPED | OUTPATIENT
Start: 2023-07-30 | End: 2023-08-13

## 2023-07-30 RX ORDER — SULFAMETHOXAZOLE/TRIMETHOPRIM 800-160 MG
1 TABLET ORAL 2 TIMES DAILY
Qty: 14 TABLET | Refills: 0 | Status: SHIPPED | OUTPATIENT
Start: 2023-07-30 | End: 2023-08-06

## 2023-07-30 NOTE — PROGRESS NOTES
I would like you to be seen in 3-8 days    SUBJECTIVE:  Jarret Eddy is a 62 year old male who presents to the clinic today for an infected cyst.  Onset of rash was   week(s) ago.   Rash is gradual onset.  Location of the rash: face.  Quality/symptoms of rash: painful and red   Symptoms are moderate and rash seems to be stable.  Previous history of a similar rash? Yes.  This is the second time this cyst has become infected  Recent exposure history: poked it with a needle, got redder    Associated symptoms include: nothing.    No past medical history on file.  Current Outpatient Medications   Medication Sig Dispense Refill    amoxicillin-clavulanate (AUGMENTIN) 875-125 MG tablet Take 1 tablet by mouth 2 times daily for 7 days 14 tablet 0    atenolol (TENORMIN) 25 MG tablet Take 12.5 mg by mouth daily      saccharomyces boulardii (FLORASTOR) 250 MG capsule Take 1 capsule (250 mg) by mouth 2 times daily for 14 days 28 capsule 0    simvastatin (ZOCOR) 20 MG tablet Take 20 mg by mouth every evening      sulfamethoxazole-trimethoprim (BACTRIM DS) 800-160 MG tablet Take 1 tablet by mouth 2 times daily for 7 days 14 tablet 0    order for DME Equipment being ordered: Exogen Bone Stimulator 1 Device 0    order for DME Equipment being ordered: Bone stimulator 1 Device 0     Social History     Tobacco Use    Smoking status: Former     Types: Cigarettes    Smokeless tobacco: Never   Substance Use Topics    Alcohol use: No       ROS:  Review of systems negative except as stated above.    EXAM:   /78 (BP Location: Left arm, Patient Position: Supine, Cuff Size: Adult Regular)   Pulse (!) 48   Temp 97.8  F (36.6  C) (Tympanic)   Resp 17   Wt 77.2 kg (170 lb 4 oz)   SpO2 97%   BMI 26.66 kg/m    GENERAL: alert, no acute distress.  SKIN: red, nonfluctuant mass on right cheek  NEURO: Normal strength and tone, sensory exam grossly normal,  normal speech and mentation    ASSESSMENT:  (L72.9,  L08.9) Infected cyst of skin   "(primary encounter diagnosis)  Plan: amoxicillin-clavulanate (AUGMENTIN) 875-125 MG         tablet, sulfamethoxazole-trimethoprim (BACTRIM         DS) 800-160 MG tablet, saccharomyces boulardii         (FLORASTOR) 250 MG capsule, Adult Dermatology         Referral      Patient Instructions   I would like you to be seen in 3-8 days    If necessary be seen in \"skin clinic\"        "

## 2023-08-01 ENCOUNTER — TELEPHONE (OUTPATIENT)
Dept: DERMATOLOGY | Facility: CLINIC | Age: 63
End: 2023-08-01
Payer: COMMERCIAL

## 2023-08-01 NOTE — TELEPHONE ENCOUNTER
S/w pt and advised of an appt tomorrow 8/2 at 11:30 at [facility] with Myrtle Jewell and pt states will take appt.  Scheduled appt.  Canceled appt on Thursday 8/3 at 2 pm at [place].    Gloria NASH RN  [facility] [place]  [phone]

## 2023-08-01 NOTE — TELEPHONE ENCOUNTER
Called home number- sounded like someone picked up but no answer- sent mychart asking if he can get the EP on Thursday at 2pm for an appointment with Jacquelyn    Thank you,    Breanna WALKERRN BSN  [facility]- [phone]

## 2023-08-01 NOTE — TELEPHONE ENCOUNTER
[facility] Call Center    Phone Message    May a detailed message be left on voicemail: yes     Reason for Call: Other: Pt call is returning call, pt said Thursday at 2 will work for pt. Please call pt back to confirm.  Sorry my team chat is not working at this time. Thanks      Action Taken: Message routed to:  Other: [facility]X derm    Travel Screening: Not Applicable

## 2023-08-01 NOTE — TELEPHONE ENCOUNTER
This encounter is being sent to inform the clinic that this patient has a referral from Salvador Leigh PA-C in [facility] for the diagnoses of Infected cyst of skin and has requested that this patient be seen within 3-5 days.  Based on the availability of our provider(s), we are unable to accommodate this request.    Were all sites offered this patient?  Yes -but pt does prefer [facility] if they have something sooner     Does scheduling algorithm request to schedule next available?  Patient has been scheduled for the first available opening with Dr. Lock on 8/31/23.  We have informed the patient that the clinic will review their referral and reach out if a sooner appointment is medically necessary.

## 2023-08-01 NOTE — TELEPHONE ENCOUNTER
Scheduled pt for Thursday 8/3 at 2 pm with Jacquelyn Askew for infected cyst.  Left voicemail message about appt and left address to [facility] [address] with zip code [address].  Advised appointment and address are available in my chart.  If has questions to call nurse at [phone].    Gloria NASH RN  [facility] [place]  [phone]

## 2023-08-02 ENCOUNTER — OFFICE VISIT (OUTPATIENT)
Dept: DERMATOLOGY | Facility: CLINIC | Age: 63
End: 2023-08-02
Payer: COMMERCIAL

## 2023-08-02 DIAGNOSIS — L72.3 INFLAMED EPIDERMOID CYST OF SKIN: Primary | ICD-10-CM

## 2023-08-02 PROCEDURE — 10060 I&D ABSCESS SIMPLE/SINGLE: CPT | Performed by: PHYSICIAN ASSISTANT

## 2023-08-02 PROCEDURE — 99203 OFFICE O/P NEW LOW 30 MIN: CPT | Mod: 25 | Performed by: PHYSICIAN ASSISTANT

## 2023-08-02 PROCEDURE — 11900 INJECT SKIN LESIONS </W 7: CPT | Performed by: PHYSICIAN ASSISTANT

## 2023-08-02 NOTE — LETTER
8/2/2023         RE: Jarret Eddy  [address]  [place] [address]        Dear Colleague,    Thank you for referring your patient, Jarret Eddy, to the [facility]. Please see a copy of my visit note below.    Jarret Eddy is an extremely pleasant 62 year old year old male patient here today for cyst on right cheek. Present for years. In the past 2 weeks developed more redness,swelling, tenderness. He notes that he has been on antibiotic since 7/30 without improvement. He has had previous cyst removed in the past.  Patient has no other skin complaints today.  Remainder of the HPI, Meds, PMH, Allergies, FH, and SH was reviewed in chart.    History reviewed. No pertinent past medical history.    No past surgical history on file.     History reviewed. No pertinent family history.    Social History     Socioeconomic History     Marital status: Single     Spouse name: Not on file     Number of children: Not on file     Years of education: Not on file     Highest education level: Not on file   Occupational History     Not on file   Tobacco Use     Smoking status: Former     Types: Cigarettes     Smokeless tobacco: Never   Substance and Sexual Activity     Alcohol use: No     Drug use: Not on file     Sexual activity: Not on file   Other Topics Concern     Not on file   Social History Narrative     Not on file     Social Determinants of Health     Financial Resource Strain: Not on file   Food Insecurity: Not on file   Transportation Needs: Not on file   Physical Activity: Not on file   Stress: Not on file   Social Connections: Not on file   Intimate Partner Violence: Not on file   Housing Stability: Not on file       Outpatient Encounter Medications as of 8/2/2023   Medication Sig Dispense Refill     amoxicillin-clavulanate (AUGMENTIN) 875-125 MG tablet Take 1 tablet by mouth 2 times daily for 7 days 14 tablet 0     sulfamethoxazole-trimethoprim (BACTRIM DS) 800-160 MG tablet Take  1 tablet by mouth 2 times daily for 7 days 14 tablet 0     atenolol (TENORMIN) 25 MG tablet Take 12.5 mg by mouth daily       order for DME Equipment being ordered: Exogen Bone Stimulator 1 Device 0     order for DME Equipment being ordered: Bone stimulator 1 Device 0     saccharomyces boulardii (FLORASTOR) 250 MG capsule Take 1 capsule (250 mg) by mouth 2 times daily for 14 days 28 capsule 0     simvastatin (ZOCOR) 20 MG tablet Take 20 mg by mouth every evening       No facility-administered encounter medications on file as of 8/2/2023.             O:   NAD, WDWN, Alert & Oriented, Mood & Affect wnl, Vitals stable   Here today alone   There were no vitals taken for this visit.   General appearance normal   Vitals stable   Alert, oriented and in no acute distress      Inflamed cyst on right lateral cheek     Eyes: Conjunctivae/lids:Normal     ENT: Lips normal    MSK:Normal    Pulm: Breathing Normal    Neuro/Psych: Orientation:Alert and Orientedx3 ; Mood/Affect:normal   A/P:  1. Inflamed cyst on right lateral cheek  Picture taken.   With patient consent, anesthetized area with lidocaine with epi, made small incision with 18 gauge needle and expressed cyst material. Routine wound care.   IL TAC: PGACAC discussed.  Risks including but not limited to injection site reaction, bruising, no resolution.  All questions answered and entertained to patient s satisfaction.  Informed consent obtained.  IL TAC in concentration of 5 mg/ml was injected ID to inflamed cyst  Total injected was 0.3 ml.  Patient tolerated without complications and given wound care instructions, including not to move product around.  Return in 4 weeks for follow-up and possible additional IL TAC.    Will refer to  dermatology surgery for removal of cyst on face.       Again, thank you for allowing me to participate in the care of your patient.        Sincerely,        Myrtle Burt PA-C

## 2023-08-02 NOTE — PATIENT INSTRUCTIONS
Cyst aftercare instruction:    Leave pressure bandage on for 24 hours if possible. If cyst begins to drain and dressing is saturated you may remove at that time, cleanse site with soapy water , cover with Aquaphor/ bacitracin and Band-Aid.  Continue daily would care to site until incision healed which may be 1-2 weeks.  Cleanse with soapy water   Pat dry and apply thin layer of bacitracin/Aquaphor  Cover with bandaid      Lidocaine/Epinephrine will wear off in approx 1-2 hours, may use Tylenol to help decrease pain to site.    Any questions please feel free to call us at [phone].

## 2023-08-02 NOTE — PROGRESS NOTES
Jarret Eddy is an extremely pleasant 62 year old year old male patient here today for cyst on right cheek. Present for years. In the past 2 weeks developed more redness,swelling, tenderness. He notes that he has been on antibiotic since 7/30 without improvement. He has had previous cyst removed in the past.  Patient has no other skin complaints today.  Remainder of the HPI, Meds, PMH, Allergies, FH, and SH was reviewed in chart.    History reviewed. No pertinent past medical history.    No past surgical history on file.     History reviewed. No pertinent family history.    Social History     Socioeconomic History    Marital status: Single     Spouse name: Not on file    Number of children: Not on file    Years of education: Not on file    Highest education level: Not on file   Occupational History    Not on file   Tobacco Use    Smoking status: Former     Types: Cigarettes    Smokeless tobacco: Never   Substance and Sexual Activity    Alcohol use: No    Drug use: Not on file    Sexual activity: Not on file   Other Topics Concern    Not on file   Social History Narrative    Not on file     Social Determinants of Health     Financial Resource Strain: Not on file   Food Insecurity: Not on file   Transportation Needs: Not on file   Physical Activity: Not on file   Stress: Not on file   Social Connections: Not on file   Intimate Partner Violence: Not on file   Housing Stability: Not on file       Outpatient Encounter Medications as of 8/2/2023   Medication Sig Dispense Refill    amoxicillin-clavulanate (AUGMENTIN) 875-125 MG tablet Take 1 tablet by mouth 2 times daily for 7 days 14 tablet 0    sulfamethoxazole-trimethoprim (BACTRIM DS) 800-160 MG tablet Take 1 tablet by mouth 2 times daily for 7 days 14 tablet 0    atenolol (TENORMIN) 25 MG tablet Take 12.5 mg by mouth daily      order for DME Equipment being ordered: Exogen Bone Stimulator 1 Device 0    order for DME Equipment being ordered: Bone stimulator 1 Device  0    saccharomyces boulardii (FLORASTOR) 250 MG capsule Take 1 capsule (250 mg) by mouth 2 times daily for 14 days 28 capsule 0    simvastatin (ZOCOR) 20 MG tablet Take 20 mg by mouth every evening       No facility-administered encounter medications on file as of 8/2/2023.             O:   NAD, WDWN, Alert & Oriented, Mood & Affect wnl, Vitals stable   Here today alone   There were no vitals taken for this visit.   General appearance normal   Vitals stable   Alert, oriented and in no acute distress      Inflamed cyst on right lateral cheek     Eyes: Conjunctivae/lids:Normal     ENT: Lips normal    MSK:Normal    Pulm: Breathing Normal    Neuro/Psych: Orientation:Alert and Orientedx3 ; Mood/Affect:normal   A/P:  1. Inflamed cyst on right lateral cheek  Picture taken.   With patient consent, anesthetized area with lidocaine with epi, made small incision with 18 gauge needle and expressed cyst material. Routine wound care.   IL TAC: PGACAC discussed.  Risks including but not limited to injection site reaction, bruising, no resolution.  All questions answered and entertained to patient s satisfaction.  Informed consent obtained.  IL TAC in concentration of 5 mg/ml was injected ID to inflamed cyst  Total injected was 0.3 ml.  Patient tolerated without complications and given wound care instructions, including not to move product around.  Return in 4 weeks for follow-up and possible additional IL TAC.    Will refer to  dermatology surgery for removal of cyst on face.

## 2023-08-07 ENCOUNTER — TELEPHONE (OUTPATIENT)
Dept: DERMATOLOGY | Facility: CLINIC | Age: 63
End: 2023-08-07
Payer: COMMERCIAL

## 2023-08-07 NOTE — TELEPHONE ENCOUNTER
Left vm for pt to call back and schedule for   inflamed cyst on right cheek   In MG.     Marisol Cartwright, Procedure  8/7/2023 3:29 PM

## 2023-08-08 NOTE — TELEPHONE ENCOUNTER
Called patient to schedule surgery with Dr. Mg    Date of Surgery: 10/02    Surgery type: excision    Consult scheduled: Yes    Has patient had mohs with us before? Not Applicable    Additional comments: iona Cartwright on 8/8/2023 at 9:27 AM

## 2023-08-24 ENCOUNTER — VIRTUAL VISIT (OUTPATIENT)
Dept: DERMATOLOGY | Facility: CLINIC | Age: 63
End: 2023-08-24
Payer: COMMERCIAL

## 2023-08-24 VITALS — HEIGHT: 67 IN | WEIGHT: 165 LBS | BODY MASS INDEX: 25.9 KG/M2

## 2023-08-24 DIAGNOSIS — L72.9 CYST OF SKIN: Primary | ICD-10-CM

## 2023-08-24 PROCEDURE — 99212 OFFICE O/P EST SF 10 MIN: CPT | Mod: 93 | Performed by: DERMATOLOGY

## 2023-08-24 ASSESSMENT — PAIN SCALES - GENERAL: PAINLEVEL: NO PAIN (0)

## 2023-08-24 NOTE — NURSING NOTE
Teledermatology Nurse Call Patients:     Are you in the [facility] at the time of the encounter? yes    Today's visit will be billed to you and your insurance.    A teledermatology visit is not as thorough as an in-person visit and the quality of the photograph sent may not be of the same quality as that taken by the dermatology clinic.    Chief Complaint   Patient presents with    Consult

## 2023-08-24 NOTE — PROGRESS NOTES
[facility] Dermatology Note  Encounter Date: Aug 24, 2023  Store-and-Forward and Telephone ([phone]). Location of teledermatologist: [facility].  Start time: 1618. End time: 1628.    Dermatology Problem List:  Inflamed cyst R lateral cheek, s/p antibiotics course; excision scheduled 10/2/23    ____________________________________________    Assessment & Plan:     # Inflamed cyst R lateral cheek, s/p antibiotics course; excision scheduled 10/2/23    - The nature, risks, benefits, and alternatives to excision surgery were discussed. The patient would like to proceed with excision surgery.  - He does not take anticoagulants.   - No indication for preop antibiotics unless the cyst becomes inflamed again.   - Likely linear repair.     Staff:     Al Mg DO    Department of Dermatology  [facility]: Phone: [phone], Fax:[phone]  [facility]: Phone: [phone], Fax: [phone]    ____________________________________________    CC: Consult    HPI:  Mr. Jarret Eddy is a(n) 62 year old male who presents today as a return patient for excision consultation for and inflamed cyst of the right lateral cheek. It had been a stable bump for 10+ years. About 3 weeks ago it enlarged and became painful. After oral antibiotics the bump calmed down and seems smaller than it used to be.     He does weigh training and cardio, no swimming.   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     Patient is otherwise feeling well, without additional skin concerns.    Labs Reviewed:  NA    Physical Exam:  Vitals: There were no vitals taken for this visit.  SKIN: Teledermatology photos were reviewed; image quality and interpretability: acceptable. Image date: 8/24/23.  - ~1.5cm pink sclerotic plaque R zygoma  - No other lesions of concern on areas  examined.     Medications:  Current Outpatient Medications   Medication    atenolol (TENORMIN) 25 MG tablet    order for DME    order for DME    simvastatin (ZOCOR) 20 MG tablet     No current facility-administered medications for this visit.      Past Medical/Surgical History:   Patient Active Problem List   Diagnosis    CARDIOVASCULAR SCREENING; LDL GOAL LESS THAN 160    Closed displaced transverse fracture of shaft of left ulna       CC Salvador Leigh PA-C  [address] [place] [address] on close of this encounter.

## 2023-08-24 NOTE — LETTER
8/24/2023         RE: Jarret Eddy  [address] [place] [address]        Dear Colleague,    Thank you for referring your patient, Jarret Eddy, to the [facility]. Please see a copy of my visit note below.                              Duplicate note opened in error.     [facility] Dermatology Note  Encounter Date: Aug 24, 2023  Store-and-Forward and Telephone ([phone]). Location of teledermatologist: [facility].  Start time: 1618. End time: 1628.    Dermatology Problem List:  Inflamed cyst R lateral cheek, s/p antibiotics course; excision scheduled 10/2/23    ____________________________________________    Assessment & Plan:     # Inflamed cyst R lateral cheek, s/p antibiotics course; excision scheduled 10/2/23    - The nature, risks, benefits, and alternatives to excision surgery were discussed. The patient would like to proceed with excision surgery.  - He does not take anticoagulants.   - No indication for preop antibiotics unless the cyst becomes inflamed again.   - Likely linear repair.     Staff:     Al Mg DO    Department of Dermatology  [facility]: Phone: [phone], Fax:[phone]  [facility]: Phone: [phone], Fax: [phone]    ____________________________________________    CC: Consult    HPI:  Mr. Jarret Eddy is a(n) 62 year old male who presents today as a return patient for excision consultation for and inflamed cyst of the right lateral cheek. It had been a stable bump for 10+ years. About 3 weeks ago it enlarged and became painful. After oral antibiotics the bump calmed down and seems smaller than it used to be.     He does weigh training and cardio, no swimming.   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     Patient is otherwise feeling well,  without additional skin concerns.    Labs Reviewed:  NA    Physical Exam:  Vitals: There were no vitals taken for this visit.  SKIN: Teledermatology photos were reviewed; image quality and interpretability: acceptable. Image date: 8/24/23.  - ~1.5cm pink sclerotic plaque R zygoma  - No other lesions of concern on areas examined.     Medications:  Current Outpatient Medications   Medication     atenolol (TENORMIN) 25 MG tablet     order for DME     order for DME     simvastatin (ZOCOR) 20 MG tablet     No current facility-administered medications for this visit.      Past Medical/Surgical History:   Patient Active Problem List   Diagnosis     CARDIOVASCULAR SCREENING; LDL GOAL LESS THAN 160     Closed displaced transverse fracture of shaft of left ulna       CC Salvador Leigh PA-C  [address] [place] [place] [address] on close of this encounter.      Again, thank you for allowing me to participate in the care of your patient.        Sincerely,        Al Mg MD

## 2023-10-02 ENCOUNTER — OFFICE VISIT (OUTPATIENT)
Dept: DERMATOLOGY | Facility: CLINIC | Age: 63
End: 2023-10-02
Payer: COMMERCIAL

## 2023-10-02 VITALS — HEART RATE: 57 BPM | SYSTOLIC BLOOD PRESSURE: 139 MMHG | DIASTOLIC BLOOD PRESSURE: 80 MMHG

## 2023-10-02 DIAGNOSIS — L72.3 INFLAMED EPIDERMOID CYST OF SKIN: ICD-10-CM

## 2023-10-02 PROCEDURE — 12051 INTMD RPR FACE/MM 2.5 CM/<: CPT | Performed by: DERMATOLOGY

## 2023-10-02 PROCEDURE — 11442 EXC FACE-MM B9+MARG 1.1-2 CM: CPT | Performed by: DERMATOLOGY

## 2023-10-02 PROCEDURE — 88304 TISSUE EXAM BY PATHOLOGIST: CPT | Performed by: DERMATOLOGY

## 2023-10-02 ASSESSMENT — PAIN SCALES - GENERAL: PAINLEVEL: NO PAIN (0)

## 2023-10-02 NOTE — LETTER
10/2/2023         RE: Jarret Eddy  [address]  [place] [address]        Dear Colleague,    Thank you for referring your patient, Jarret Eddy, to the [facility]. Please see a copy of my visit note below.    DERMATOLOGY EXCISION PROCEDURE NOTE    Dermatology Problem List:  Inflamed cyst R upper cheek excised 10/2/2023    _______________________________________________    NAME OF PROCEDURE: Excision intermediate layered linear closure  Staff surgeon: Dr. Al Mg  Scrub Nurse: Nissa Mckenzie CMA    PRE-OPERATIVE DIAGNOSIS:  cyst  POST-OPERATIVE DIAGNOSIS: Same   LOCATION: right upper cheek  FINAL EXCISION SIZE(DEFECT SIZE): 1.2 x 0.9 cm  MARGIN: 1 mm  FINAL REPAIR LENGTH: 2.1 cm   ANESTHESIA: 2 ml 1% lidocaine with 1:100,000 epinephrine    INDICATIONS: This patient presented with a 1.1 x 0.8 cm cyst. Excision was indicated. We discussed the principles of treatment and most likely complications including scarring, bleeding, infection, incomplete excision, wound dehiscence, pain, nerve damage, and recurrence. Informed consent was obtained and the patient underwent the procedure as follows:    PROCEDURE: The patient was taken to the operative suite. Time-out was performed.  The treatment area was anesthetized with 1% lidocaine with epinephrine. The area was prepped with Chlorhexidine and rinsed with sterile saline and draped with sterile towels. The lesion was delineated and excised down to subcutaneous fat in a elliptical manner. Hemostasis was obtained by electrocoagulation.     REPAIR: An intermediate layered linear closure was selected as the procedure which would maximally preserve both function and cosmesis.    After the excision of the tumor, the area was carefully undermined. Hemostasis was obtained with bipolar electrocoagulation.  Closure was oriented so that the wound was in the patient's natural skin tension lines. The deep subcutaneous and dermal layers were  then closed with 4-0 monocryl sutures. The epidermis was then carefully approximated along the length of the wound using 5-0 fast absorbing gut simple running sutures.     Estimated blood loss was less than 10 ml for all surgical sites. A sterile pressure dressing was applied and wound care instructions, with a written handout, were given. The patient was discharged from the [facility] alert and ambulatory.    The patient elected for pathology results to automatically release and understands that the clinical staff will contact them as soon as possible to notify them of the results.      Dr. Al Mg was immediately available for the entire surgery and was physicially present for the key portions of the procedure.    Anatomic Pathology Results: pending    Clinical Follow-Up: as needed for concerns     Staff Involved:  Staff Only      Al Mg DO    Department of Dermatology  [facility]: Phone: [phone], Fax:[phone]  [facility]: Phone: [phone], Fax: [phone]      Again, thank you for allowing me to participate in the care of your patient.        Sincerely,        Al Mg MD

## 2023-10-02 NOTE — PATIENT INSTRUCTIONS
Excision/Mohs Wound Care Instructions  I will experience scar, altered skin color, bleeding, swelling, pain, crusting and redness. I may experience altered sensation. Risks are excessive bleeding, infection, muscle weakness, thick (hypertrophic or keloidal) scar, and recurrence. A second procedure may be recommended to obtain the best cosmetic or functional result.  Possible complications of any surgical procedure are bleeding, infection, scarring, alteration in skin color and sensation, muscle weakness in the area, wound dehiscence or seperation, or recurrence of the lesion or disease. On occasion, after healing, a secondary procedure or revision may be recommended in order to obtain the best cosmetic or functional result.   After your surgery, a pressure bandage will be placed over the area that has sutures. This will help prevent bleeding. Please follow these instructions, as they will help you to prevent complications as your wound heals.  For the First 48 hours After Surgery:  Leave the pressure bandage on and keep it dry. If it should come loose, you may retape it, but do not take it off.  Relax and take it easy. Do not do any vigorous exercise, heavy lifting, or bending forward. This could cause the wound to bleed.  Post-operative pain is usually mild. You may alternate between 1000 mg of Tylenol (acetaminophen) and 400 mg of Ibuprofen every 4 hours.  Do not take more than 4,000mg of acetaminophen in a 24 hour period or 3200 mg of Ibuprofen in a 24 hr period.  Avoid alcohol and vitamin E as these may increase your tendency to bleed.  You may put an ice pack around the bandaged area for 20 minutes every 2-3 hours. This may help reduce swelling, bruising, and pain. Make sure the ice pack is waterproof so that the pressure bandage does not get wet.   You may see a small amount of drainage or blood on your pressure bandage. This is normal. However, if drainage or bleeding continues or saturates the bandage, you  will need to apply firm pressure over the bandage with a washcloth for 15 minutes. If bleeding continues after applying pressure for 15 minutes then go to the nearest emergency room.  48 Hours After Surgery  Carefully remove the bandage and start daily wound care and dressing changes. You may also now shower and get the wound wet.  Daily Wound Care:  Wash wound with a mild soap and water.  Use caution when washing the wound, be gentle and do not let the forceful shower stream hit the wound directly.  Pat dry.  Apply Vaseline (from a new container or tube) over the suture line with a Q-tip. It is very important to keep the wound continuously moist, as wounds heal best in a moist environment.  Keep the site covered until sutures have dissolved.  You can cover it with a Telfa (non-stick) dressing and tape or a band-aid.    If you are unable to keep wound covered, you must apply Vaseline every 2-3 hours (while awake) to ensure it is being kept moist for optimal healing. A dressing overnight is recommended to keep the area moist.  Call Us If:  You have pain that is not controlled with Tylenol/Ibuprofen  You have signs or symptoms of an infection, such as: fever over 100 degrees F, redness, warmth, or foul-smelling or yellow drainage from the wound.  Who should I call with questions?  [facility]: [phone] - ask for [facility]  [facility]: [phone]  For urgent needs outside of business hours call the [facility] at [phone] and ask to speak with the dermatology resident on call

## 2023-10-02 NOTE — NURSING NOTE
Jarret Eddy's chief complaint for this visit includes:  Chief Complaint   Patient presents with    Procedure     EIC - right cheek     PCP: Domingo Gleason    Referring Provider:  Myrtle Jewell PA-C  [address] [place] [address]    /80   Pulse 57   No Pain (0)      No Known Allergies      Do you need any medication refills at today's visit? No    Nissa Mckenzie CMA

## 2023-10-02 NOTE — NURSING NOTE
The following medication was given:     MEDICATION:  Lidocaine with epinephrine 1% 1:[id number]  ROUTE: SQ  SITE: see procedure note  DOSE: 2 mL  LOT #: [id number]  : [employer]  EXPIRATION DATE: 03/31/2025  NDC#: 74784-098-11  Was there drug waste? no  Multi-dose vial: Yes    Vaseline and pressure dressing applied to excision site on right upper cheek.  Wound care instructions reviewed with patient and AVS provided.  Patient verbalized understanding.  Patient will follow up for suture removal: N/A.  No further questions or concerns at this time.      Nissa Mckenzie CMA  October 2, 2023

## 2023-10-02 NOTE — PROGRESS NOTES
DERMATOLOGY EXCISION PROCEDURE NOTE    Dermatology Problem List:  Inflamed cyst R upper cheek excised 10/2/2023    _______________________________________________    NAME OF PROCEDURE: Excision intermediate layered linear closure  Staff surgeon: Dr. Al Mg  Scrub Nurse: Nissa Mckenzie CMA    PRE-OPERATIVE DIAGNOSIS:  cyst  POST-OPERATIVE DIAGNOSIS: Same   LOCATION: right upper cheek  FINAL EXCISION SIZE(DEFECT SIZE): 1.2 x 0.9 cm  MARGIN: 1 mm  FINAL REPAIR LENGTH: 2.1 cm   ANESTHESIA: 2 ml 1% lidocaine with 1:100,000 epinephrine    INDICATIONS: This patient presented with a 1.1 x 0.8 cm cyst. Excision was indicated. We discussed the principles of treatment and most likely complications including scarring, bleeding, infection, incomplete excision, wound dehiscence, pain, nerve damage, and recurrence. Informed consent was obtained and the patient underwent the procedure as follows:    PROCEDURE: The patient was taken to the operative suite. Time-out was performed.  The treatment area was anesthetized with 1% lidocaine with epinephrine. The area was prepped with Chlorhexidine and rinsed with sterile saline and draped with sterile towels. The lesion was delineated and excised down to subcutaneous fat in a elliptical manner. Hemostasis was obtained by electrocoagulation.     REPAIR: An intermediate layered linear closure was selected as the procedure which would maximally preserve both function and cosmesis.    After the excision of the tumor, the area was carefully undermined. Hemostasis was obtained with bipolar electrocoagulation.  Closure was oriented so that the wound was in the patient's natural skin tension lines. The deep subcutaneous and dermal layers were then closed with 4-0 monocryl sutures. The epidermis was then carefully approximated along the length of the wound using 5-0 fast absorbing gut simple running sutures.     Estimated blood loss was less than 10 ml for all surgical sites. A sterile pressure  dressing was applied and wound care instructions, with a written handout, were given. The [facility] and [facility]ulatory.    The patient elected for pathology results to automatically release and understands that the clinical staff will contact them as soon as possible to notify them of the results.      Dr. Al Mg was immediately available for the entire surgery and was physicially present for the key portions of the procedure.    Anatomic Pathology Results: pending    Clinical Follow-Up: as needed for concerns     Staff Involved:  Staff Only      Al Mg DO    Department of Dermatology  [facility]: Phone: [phone], Fax:[phone]  [facility]: Phone: [phone], Fax: [phone]

## 2023-10-04 LAB
PATH REPORT.COMMENTS IMP SPEC: NORMAL
PATH REPORT.COMMENTS IMP SPEC: NORMAL
PATH REPORT.FINAL DX SPEC: NORMAL
PATH REPORT.GROSS SPEC: NORMAL
PATH REPORT.MICROSCOPIC SPEC OTHER STN: NORMAL
PATH REPORT.RELEVANT HX SPEC: NORMAL

## 2023-12-07 ENCOUNTER — TRANSFERRED RECORDS (OUTPATIENT)
Dept: HEALTH INFORMATION MANAGEMENT | Facility: CLINIC | Age: 63
End: 2023-12-07
Payer: COMMERCIAL

## 2024-01-11 NOTE — TELEPHONE ENCOUNTER
Patient called back at 12:45. He said that he can be reached at a different phone number for the rest of the day. [phone]    Rachel FRANK (R)     Per pt calling again, states he was told on 1/5 OV he would have surgery in a week or two. Please call thank you.

## 2024-02-05 ENCOUNTER — TRANSFERRED RECORDS (OUTPATIENT)
Dept: HEALTH INFORMATION MANAGEMENT | Facility: CLINIC | Age: 64
End: 2024-02-05
Payer: COMMERCIAL

## 2024-06-22 ENCOUNTER — HEALTH MAINTENANCE LETTER (OUTPATIENT)
Age: 64
End: 2024-06-22

## 2024-11-09 ENCOUNTER — HEALTH MAINTENANCE LETTER (OUTPATIENT)
Age: 64
End: 2024-11-09

## 2025-05-02 ENCOUNTER — TRANSFERRED RECORDS (OUTPATIENT)
Dept: HEALTH INFORMATION MANAGEMENT | Facility: CLINIC | Age: 65
End: 2025-05-02
Payer: COMMERCIAL